# Patient Record
Sex: FEMALE | Race: BLACK OR AFRICAN AMERICAN | NOT HISPANIC OR LATINO
[De-identification: names, ages, dates, MRNs, and addresses within clinical notes are randomized per-mention and may not be internally consistent; named-entity substitution may affect disease eponyms.]

---

## 2017-05-22 ENCOUNTER — APPOINTMENT (OUTPATIENT)
Dept: OTOLARYNGOLOGY | Facility: CLINIC | Age: 72
End: 2017-05-22

## 2017-05-22 VITALS
TEMPERATURE: 98.3 F | OXYGEN SATURATION: 99 % | SYSTOLIC BLOOD PRESSURE: 140 MMHG | DIASTOLIC BLOOD PRESSURE: 83 MMHG | HEART RATE: 115 BPM

## 2017-06-06 ENCOUNTER — APPOINTMENT (OUTPATIENT)
Dept: GASTROENTEROLOGY | Facility: CLINIC | Age: 72
End: 2017-06-06

## 2017-06-06 VITALS
OXYGEN SATURATION: 98 % | HEART RATE: 94 BPM | HEIGHT: 66 IN | BODY MASS INDEX: 32.95 KG/M2 | DIASTOLIC BLOOD PRESSURE: 80 MMHG | TEMPERATURE: 97.8 F | WEIGHT: 205 LBS | RESPIRATION RATE: 16 BRPM | SYSTOLIC BLOOD PRESSURE: 130 MMHG

## 2017-06-06 DIAGNOSIS — Z82.61 FAMILY HISTORY OF ARTHRITIS: ICD-10-CM

## 2017-06-06 DIAGNOSIS — Z86.79 PERSONAL HISTORY OF OTHER DISEASES OF THE CIRCULATORY SYSTEM: ICD-10-CM

## 2017-06-06 DIAGNOSIS — Z86.39 PERSONAL HISTORY OF OTHER ENDOCRINE, NUTRITIONAL AND METABOLIC DISEASE: ICD-10-CM

## 2017-06-06 DIAGNOSIS — Z86.73 PERSONAL HISTORY OF TRANSIENT ISCHEMIC ATTACK (TIA), AND CEREBRAL INFARCTION W/OUT RESIDUAL DEFICITS: ICD-10-CM

## 2017-06-06 DIAGNOSIS — Z80.0 FAMILY HISTORY OF MALIGNANT NEOPLASM OF DIGESTIVE ORGANS: ICD-10-CM

## 2017-06-06 DIAGNOSIS — Z85.9 PERSONAL HISTORY OF MALIGNANT NEOPLASM, UNSPECIFIED: ICD-10-CM

## 2017-06-07 RX ORDER — TRIAMTERENE AND HYDROCHLOROTHIAZIDE 25; 37.5 MG/1; MG/1
37.5-25 TABLET ORAL
Qty: 90 | Refills: 0 | Status: ACTIVE | COMMUNITY
Start: 2017-03-29

## 2017-06-08 ENCOUNTER — APPOINTMENT (OUTPATIENT)
Dept: THORACIC SURGERY | Facility: CLINIC | Age: 72
End: 2017-06-08

## 2017-06-08 VITALS
TEMPERATURE: 97.6 F | BODY MASS INDEX: 32.95 KG/M2 | SYSTOLIC BLOOD PRESSURE: 128 MMHG | WEIGHT: 205 LBS | RESPIRATION RATE: 18 BRPM | HEART RATE: 82 BPM | OXYGEN SATURATION: 99 % | DIASTOLIC BLOOD PRESSURE: 66 MMHG | HEIGHT: 66 IN

## 2017-06-08 DIAGNOSIS — K21.9 GASTRO-ESOPHAGEAL REFLUX DISEASE W/OUT ESOPHAGITIS: ICD-10-CM

## 2017-06-08 DIAGNOSIS — Z87.891 PERSONAL HISTORY OF NICOTINE DEPENDENCE: ICD-10-CM

## 2017-06-08 RX ORDER — HYDROCHLOROTHIAZIDE 12.5 MG/1
12.5 CAPSULE ORAL
Qty: 30 | Refills: 0 | Status: DISCONTINUED | COMMUNITY
Start: 2017-03-04 | End: 2017-06-08

## 2017-06-08 RX ORDER — AZITHROMYCIN 250 MG/1
250 TABLET, FILM COATED ORAL
Qty: 6 | Refills: 0 | Status: DISCONTINUED | COMMUNITY
Start: 2017-04-07 | End: 2017-06-08

## 2017-06-08 RX ORDER — LOSARTAN POTASSIUM 25 MG/1
25 TABLET, FILM COATED ORAL
Qty: 30 | Refills: 0 | Status: DISCONTINUED | COMMUNITY
Start: 2017-03-04 | End: 2017-06-08

## 2017-06-12 ENCOUNTER — APPOINTMENT (OUTPATIENT)
Dept: PULMONOLOGY | Facility: CLINIC | Age: 72
End: 2017-06-12

## 2017-06-12 ENCOUNTER — APPOINTMENT (OUTPATIENT)
Dept: OTOLARYNGOLOGY | Facility: CLINIC | Age: 72
End: 2017-06-12

## 2017-06-12 VITALS
BODY MASS INDEX: 32.95 KG/M2 | WEIGHT: 205 LBS | DIASTOLIC BLOOD PRESSURE: 66 MMHG | OXYGEN SATURATION: 98 % | HEIGHT: 66 IN | SYSTOLIC BLOOD PRESSURE: 110 MMHG | TEMPERATURE: 97.8 F | HEART RATE: 86 BPM

## 2017-06-12 DIAGNOSIS — R91.1 SOLITARY PULMONARY NODULE: ICD-10-CM

## 2017-06-12 RX ORDER — GABAPENTIN 300 MG/1
300 CAPSULE ORAL
Refills: 0 | Status: ACTIVE | COMMUNITY

## 2017-06-13 LAB
ANION GAP SERPL CALC-SCNC: 17 MMOL/L
APTT BLD: 30 SEC
BUN SERPL-MCNC: 16 MG/DL
CALCIUM SERPL-MCNC: 10.1 MG/DL
CHLORIDE SERPL-SCNC: 99 MMOL/L
CO2 SERPL-SCNC: 28 MMOL/L
CREAT SERPL-MCNC: 0.85 MG/DL
GLUCOSE SERPL-MCNC: 103 MG/DL
POTASSIUM SERPL-SCNC: 4.1 MMOL/L
SODIUM SERPL-SCNC: 144 MMOL/L

## 2017-06-14 ENCOUNTER — RESULT REVIEW (OUTPATIENT)
Age: 72
End: 2017-06-14

## 2017-06-14 ENCOUNTER — OUTPATIENT (OUTPATIENT)
Dept: OUTPATIENT SERVICES | Facility: HOSPITAL | Age: 72
LOS: 1 days | Discharge: HOME | End: 2017-06-14
Payer: MEDICARE

## 2017-06-14 ENCOUNTER — APPOINTMENT (OUTPATIENT)
Dept: PULMONOLOGY | Facility: HOSPITAL | Age: 72
End: 2017-06-14

## 2017-06-14 LAB
B PERT IGG+IGM PNL SER: SIGNIFICANT CHANGE UP
B PERT IGG+IGM PNL SER: SIGNIFICANT CHANGE UP
COLOR FLD: SIGNIFICANT CHANGE UP
COLOR FLD: SIGNIFICANT CHANGE UP
FLUID INTAKE SUBSTANCE CLASS: SIGNIFICANT CHANGE UP
FLUID INTAKE SUBSTANCE CLASS: SIGNIFICANT CHANGE UP
FLUID SEGMENTED GRANULOCYTES: 19 % — SIGNIFICANT CHANGE UP
FLUID SEGMENTED GRANULOCYTES: 97 % — SIGNIFICANT CHANGE UP
GRAM STN FLD: SIGNIFICANT CHANGE UP
GRAM STN FLD: SIGNIFICANT CHANGE UP
MONOS+MACROS # FLD: 3 % — SIGNIFICANT CHANGE UP
MONOS+MACROS # FLD: 5 % — SIGNIFICANT CHANGE UP
RCV VOL RI: 2450 /UL — HIGH (ref 0–5)
RCV VOL RI: 68 /UL — HIGH (ref 0–5)
SPECIMEN SOURCE FLD: SIGNIFICANT CHANGE UP
SPECIMEN SOURCE FLD: SIGNIFICANT CHANGE UP
SPECIMEN SOURCE: SIGNIFICANT CHANGE UP
SPECIMEN SOURCE: SIGNIFICANT CHANGE UP
TOTAL NUCLEATED CELL COUNT, BODY FLUID: 193 /UL — HIGH (ref 0–5)
TOTAL NUCLEATED CELL COUNT, BODY FLUID: 24 /UL — HIGH (ref 0–5)
TUBE TYPE: SIGNIFICANT CHANGE UP
TUBE TYPE: SIGNIFICANT CHANGE UP

## 2017-06-14 PROCEDURE — 88305 TISSUE EXAM BY PATHOLOGIST: CPT

## 2017-06-14 PROCEDURE — 87015 SPECIMEN INFECT AGNT CONCNTJ: CPT

## 2017-06-14 PROCEDURE — 87206 SMEAR FLUORESCENT/ACID STAI: CPT

## 2017-06-14 PROCEDURE — 31624 DX BRONCHOSCOPE/LAVAGE: CPT

## 2017-06-14 PROCEDURE — 87102 FUNGUS ISOLATION CULTURE: CPT

## 2017-06-14 PROCEDURE — 87116 MYCOBACTERIA CULTURE: CPT

## 2017-06-14 PROCEDURE — 89051 BODY FLUID CELL COUNT: CPT

## 2017-06-14 PROCEDURE — 87070 CULTURE OTHR SPECIMN AEROBIC: CPT

## 2017-06-14 PROCEDURE — 88112 CYTOPATH CELL ENHANCE TECH: CPT

## 2017-06-14 PROCEDURE — 88173 CYTOPATH EVAL FNA REPORT: CPT

## 2017-06-14 PROCEDURE — 31622 DX BRONCHOSCOPE/WASH: CPT

## 2017-06-14 PROCEDURE — 31653 BRONCH EBUS SAMPLNG 3/> NODE: CPT

## 2017-06-15 LAB
NIGHT BLUE STAIN TISS: SIGNIFICANT CHANGE UP
NIGHT BLUE STAIN TISS: SIGNIFICANT CHANGE UP
NON-GYNECOLOGICAL CYTOLOGY STUDY: SIGNIFICANT CHANGE UP
SPECIMEN SOURCE: SIGNIFICANT CHANGE UP
SPECIMEN SOURCE: SIGNIFICANT CHANGE UP

## 2017-06-16 ENCOUNTER — APPOINTMENT (OUTPATIENT)
Dept: GASTROENTEROLOGY | Facility: CLINIC | Age: 72
End: 2017-06-16

## 2017-06-16 LAB
CULTURE RESULTS: SIGNIFICANT CHANGE UP
CULTURE RESULTS: SIGNIFICANT CHANGE UP
NON-GYNECOLOGICAL CYTOLOGY STUDY: SIGNIFICANT CHANGE UP
SPECIMEN SOURCE: SIGNIFICANT CHANGE UP
SPECIMEN SOURCE: SIGNIFICANT CHANGE UP

## 2017-06-19 DIAGNOSIS — Z79.899 OTHER LONG TERM (CURRENT) DRUG THERAPY: ICD-10-CM

## 2017-06-19 DIAGNOSIS — I10 ESSENTIAL (PRIMARY) HYPERTENSION: ICD-10-CM

## 2017-06-19 DIAGNOSIS — E11.9 TYPE 2 DIABETES MELLITUS WITHOUT COMPLICATIONS: ICD-10-CM

## 2017-06-19 DIAGNOSIS — Z79.82 LONG TERM (CURRENT) USE OF ASPIRIN: ICD-10-CM

## 2017-06-19 DIAGNOSIS — R59.0 LOCALIZED ENLARGED LYMPH NODES: ICD-10-CM

## 2017-06-19 DIAGNOSIS — Z79.84 LONG TERM (CURRENT) USE OF ORAL HYPOGLYCEMIC DRUGS: ICD-10-CM

## 2017-07-06 LAB
CULTURE RESULTS: SIGNIFICANT CHANGE UP
SPECIMEN SOURCE: SIGNIFICANT CHANGE UP

## 2017-07-15 LAB
CULTURE RESULTS: SIGNIFICANT CHANGE UP
SPECIMEN SOURCE: SIGNIFICANT CHANGE UP

## 2017-07-29 LAB
CULTURE RESULTS: SIGNIFICANT CHANGE UP
CULTURE RESULTS: SIGNIFICANT CHANGE UP
SPECIMEN SOURCE: SIGNIFICANT CHANGE UP
SPECIMEN SOURCE: SIGNIFICANT CHANGE UP

## 2017-09-24 ENCOUNTER — FORM ENCOUNTER (OUTPATIENT)
Age: 72
End: 2017-09-24

## 2017-09-25 ENCOUNTER — OUTPATIENT (OUTPATIENT)
Dept: OUTPATIENT SERVICES | Facility: HOSPITAL | Age: 72
LOS: 1 days | End: 2017-09-25
Payer: MEDICARE

## 2017-09-25 PROCEDURE — 71250 CT THORAX DX C-: CPT | Mod: 26

## 2017-09-25 PROCEDURE — 71250 CT THORAX DX C-: CPT

## 2017-09-28 ENCOUNTER — APPOINTMENT (OUTPATIENT)
Dept: PULMONOLOGY | Facility: CLINIC | Age: 72
End: 2017-09-28
Payer: MEDICARE

## 2017-09-28 VITALS — SYSTOLIC BLOOD PRESSURE: 108 MMHG | OXYGEN SATURATION: 97 % | DIASTOLIC BLOOD PRESSURE: 64 MMHG | HEART RATE: 67 BPM

## 2017-09-28 PROCEDURE — 99215 OFFICE O/P EST HI 40 MIN: CPT

## 2017-09-28 RX ORDER — PRAVASTATIN SODIUM 20 MG/1
20 TABLET ORAL
Refills: 0 | Status: ACTIVE | COMMUNITY
Start: 2017-09-28

## 2017-09-28 RX ORDER — SIMVASTATIN 10 MG/1
10 TABLET, FILM COATED ORAL
Qty: 90 | Refills: 0 | Status: DISCONTINUED | COMMUNITY
Start: 2017-04-06 | End: 2017-09-28

## 2018-02-21 ENCOUNTER — APPOINTMENT (OUTPATIENT)
Dept: OTOLARYNGOLOGY | Facility: CLINIC | Age: 73
End: 2018-02-21

## 2018-06-08 ENCOUNTER — APPOINTMENT (OUTPATIENT)
Dept: CT IMAGING | Facility: HOSPITAL | Age: 73
End: 2018-06-08
Payer: MEDICARE

## 2018-06-09 ENCOUNTER — FORM ENCOUNTER (OUTPATIENT)
Age: 73
End: 2018-06-09

## 2018-06-10 ENCOUNTER — APPOINTMENT (OUTPATIENT)
Dept: CT IMAGING | Facility: HOSPITAL | Age: 73
End: 2018-06-10

## 2018-06-10 ENCOUNTER — OUTPATIENT (OUTPATIENT)
Dept: OUTPATIENT SERVICES | Facility: HOSPITAL | Age: 73
LOS: 1 days | End: 2018-06-10
Payer: MEDICARE

## 2018-06-10 PROCEDURE — 71250 CT THORAX DX C-: CPT

## 2018-06-10 PROCEDURE — 71250 CT THORAX DX C-: CPT | Mod: 26

## 2018-06-18 ENCOUNTER — MOBILE ON CALL (OUTPATIENT)
Age: 73
End: 2018-06-18

## 2018-06-28 ENCOUNTER — APPOINTMENT (OUTPATIENT)
Dept: PULMONOLOGY | Facility: CLINIC | Age: 73
End: 2018-06-28
Payer: MEDICARE

## 2018-07-05 ENCOUNTER — APPOINTMENT (OUTPATIENT)
Dept: PULMONOLOGY | Facility: CLINIC | Age: 73
End: 2018-07-05
Payer: MEDICARE

## 2018-07-05 VITALS
BODY MASS INDEX: 32.78 KG/M2 | SYSTOLIC BLOOD PRESSURE: 128 MMHG | HEART RATE: 105 BPM | WEIGHT: 204 LBS | DIASTOLIC BLOOD PRESSURE: 84 MMHG | HEIGHT: 66 IN | OXYGEN SATURATION: 93 %

## 2018-07-05 DIAGNOSIS — R06.02 SHORTNESS OF BREATH: ICD-10-CM

## 2018-07-05 PROCEDURE — 99215 OFFICE O/P EST HI 40 MIN: CPT

## 2018-07-06 RX ORDER — ALBUTEROL SULFATE 90 UG/1
108 (90 BASE) AEROSOL, METERED RESPIRATORY (INHALATION)
Qty: 8.5 | Refills: 5 | Status: ACTIVE | COMMUNITY
Start: 2018-07-06 | End: 1900-01-01

## 2018-07-25 ENCOUNTER — APPOINTMENT (OUTPATIENT)
Dept: OTOLARYNGOLOGY | Facility: CLINIC | Age: 73
End: 2018-07-25
Payer: MEDICARE

## 2018-07-25 ENCOUNTER — OUTPATIENT (OUTPATIENT)
Dept: OUTPATIENT SERVICES | Facility: HOSPITAL | Age: 73
LOS: 1 days | End: 2018-07-25
Payer: MEDICARE

## 2018-07-25 VITALS
WEIGHT: 205 LBS | OXYGEN SATURATION: 100 % | DIASTOLIC BLOOD PRESSURE: 74 MMHG | HEIGHT: 64.5 IN | SYSTOLIC BLOOD PRESSURE: 114 MMHG | TEMPERATURE: 97.7 F | BODY MASS INDEX: 34.57 KG/M2 | HEART RATE: 75 BPM

## 2018-07-25 DIAGNOSIS — R55 SYNCOPE AND COLLAPSE: ICD-10-CM

## 2018-07-25 DIAGNOSIS — C07 MALIGNANT NEOPLASM OF PAROTID GLAND: ICD-10-CM

## 2018-07-25 DIAGNOSIS — R06.02 SHORTNESS OF BREATH: ICD-10-CM

## 2018-07-25 PROCEDURE — 94618 PULMONARY STRESS TESTING: CPT

## 2018-07-25 PROCEDURE — 99213 OFFICE O/P EST LOW 20 MIN: CPT

## 2018-07-25 PROCEDURE — 94618 PULMONARY STRESS TESTING: CPT | Mod: 26

## 2018-07-26 PROBLEM — R55 PRE-SYNCOPE: Status: ACTIVE | Noted: 2018-07-26

## 2018-08-03 ENCOUNTER — APPOINTMENT (OUTPATIENT)
Dept: CARDIOLOGY | Facility: CLINIC | Age: 73
End: 2018-08-03

## 2018-12-27 ENCOUNTER — MOBILE ON CALL (OUTPATIENT)
Age: 73
End: 2018-12-27

## 2019-02-04 PROBLEM — R91.1 PULMONARY LESION: Status: ACTIVE | Noted: 2017-06-12

## 2019-09-27 ENCOUNTER — APPOINTMENT (OUTPATIENT)
Dept: PULMONOLOGY | Facility: CLINIC | Age: 74
End: 2019-09-27
Payer: MEDICARE

## 2019-09-27 VITALS
WEIGHT: 193 LBS | RESPIRATION RATE: 12 BRPM | TEMPERATURE: 97.7 F | OXYGEN SATURATION: 95 % | HEART RATE: 88 BPM | BODY MASS INDEX: 32.55 KG/M2 | DIASTOLIC BLOOD PRESSURE: 80 MMHG | HEIGHT: 64.5 IN | SYSTOLIC BLOOD PRESSURE: 120 MMHG

## 2019-09-27 DIAGNOSIS — R91.1 SOLITARY PULMONARY NODULE: ICD-10-CM

## 2019-09-27 DIAGNOSIS — R59.0 LOCALIZED ENLARGED LYMPH NODES: ICD-10-CM

## 2019-09-27 PROCEDURE — 99214 OFFICE O/P EST MOD 30 MIN: CPT

## 2019-09-27 NOTE — PHYSICAL EXAM
[General Appearance - Well Developed] : well developed [Normal Appearance] : normal appearance [Well Groomed] : well groomed [No Deformities] : no deformities [Normal Conjunctiva] : the conjunctiva exhibited no abnormalities [Eyelids - No Xanthelasma] : the eyelids demonstrated no xanthelasmas [Normal Oropharynx] : normal oropharynx [Jugular Venous Distention Increased] : there was no jugular-venous distention [Neck Appearance] : the appearance of the neck was normal [Neck Cervical Mass (___cm)] : no neck mass was observed [Thyroid Diffuse Enlargement] : the thyroid was not enlarged [Thyroid Nodule] : there were no palpable thyroid nodules [Heart Sounds] : normal S1 and S2 [Heart Rate And Rhythm] : heart rate and rhythm were normal [Murmurs] : no murmurs present [Respiration, Rhythm And Depth] : normal respiratory rhythm and effort [Auscultation Breath Sounds / Voice Sounds] : lungs were clear to auscultation bilaterally [Exaggerated Use Of Accessory Muscles For Inspiration] : no accessory muscle use [Abdomen Tenderness] : non-tender [Abdomen Soft] : soft [Abdomen Mass (___ Cm)] : no abdominal mass palpated [Abnormal Walk] : normal gait [Gait - Sufficient For Exercise Testing] : the gait was sufficient for exercise testing [Nail Clubbing] : no clubbing of the fingernails [Fingers Osler's Nodes] : Osler's nodes were not seenon the fingers [Cyanosis, Localized] : no localized cyanosis [Skin Lesions] : no skin lesions [Sensation] : the sensory exam was normal to light touch and pinprick [Deep Tendon Reflexes (DTR)] : deep tendon reflexes were 2+ and symmetric [Oriented To Time, Place, And Person] : oriented to person, place, and time [No Focal Deficits] : no focal deficits [Affect] : the affect was normal [Mood] : the mood was normal [Skin Color & Pigmentation] : normal skin color and pigmentation [Skin Turgor] : normal skin turgor [] : no rash [General Appearance - Alert] : alert [General Appearance - In No Acute Distress] : in no acute distress [General Appearance - Well Nourished] : well nourished [No Pulse Discrepancy] : no pulse discrepancy detected [Full Pulse] : peripheral pulses were full [Fingers] :  capillary refill of the fingers was normal [Left Fingers] :  capillary refill of the left fingers was normal [Varicose Veins Of The Right Leg] : the patient has no varicose veins of the right leg [Right Fingers] :  capillary refill of the right fingers was normal [Varicose Veins Of The Left Leg] : the patient has no varicose veins of the left leg [FreeTextEntry1] : deferred

## 2019-09-27 NOTE — PROCEDURE
[FreeTextEntry1] : CHEST CT 6/10/18 personally reviewed. Significant for stable partially calcified mediastinal and bilateral hilar lymph nodes. Small, stable linear subpleural opacity in RUL. Small, stable subpleural nodular density in NISREEN. \par \par \par PFTS today 18 personally reviewed. No evidence of obstruction/bronchodilator response. Patient does have reduced DLCO (51% predicted)\par \par \par DLCO: 51% predicted (12.1)\par FEV/FVC1: 78% predicted\par FVC: 104% predicted (3.08)\par FEV1: 107% predicted (2.40)\par T% predicted (4.94)

## 2019-09-27 NOTE — DISCUSSION/SUMMARY
[FreeTextEntry1] : A 72 year old female, with PMH of carotid gland CA s/p Lt parotidectomy in 2004 initially presenting for evaluation of hilar LAP and a pulmonary nodule that was noted incidentally as a part of her work up for rib pain and SOB. S/p bronchoscopy with EBUS with sampling of mediastinal LN which was all negative. Returns today with a repeat CT of the chest which is essentially unchanged. Given the stability of the findings within a long interval - unlikely to be malignant. \par \par \par Plan\par - Repeat CT chest in one year (July 2019)\par - PFTs done today, as she has been experience RODAS X 1.5 years and last breathing studies were back in 2014. PFTs do not demonstrate obstruction, but patient does have a reduced DLCO\par - Will refer patient for a 6 minute walk test for further eval of RODAS

## 2019-09-27 NOTE — ASSESSMENT
[FreeTextEntry1] : Pt doing well, mostly asymptomatic. Will obtain follow up CT chest for her pulm nodule.

## 2019-09-27 NOTE — REVIEW OF SYSTEMS
[As Noted in HPI] : as noted in HPI [Dyspnea] : dyspnea [Negative] : Sleep Disorder [Fever] : no fever [Chills] : no chills [Fatigue] : no fatigue [Dry Eyes] : no dryness of the eyes [Cough] : no cough [Sputum] : not coughing up ~M sputum [Hemoptysis] : no hemoptysis [FreeTextEntry8] : musculoskeletal chest pain

## 2019-09-27 NOTE — HISTORY OF PRESENT ILLNESS
[FreeTextEntry1] : A 72 year old female, with PMH of RUL nodule and adenopathy which has been under radiographic surveillance and she is here for interval 1 year follow up.\par She has no cough, SOB, or hemoptysis. \par

## 2019-10-11 ENCOUNTER — MOBILE ON CALL (OUTPATIENT)
Age: 74
End: 2019-10-11

## 2019-10-11 ENCOUNTER — OTHER (OUTPATIENT)
Age: 74
End: 2019-10-11

## 2019-10-31 ENCOUNTER — APPOINTMENT (OUTPATIENT)
Dept: PULMONOLOGY | Facility: CLINIC | Age: 74
End: 2019-10-31

## 2019-11-13 ENCOUNTER — APPOINTMENT (OUTPATIENT)
Dept: OTOLARYNGOLOGY | Facility: CLINIC | Age: 74
End: 2019-11-13

## 2020-11-04 ENCOUNTER — TRANSCRIBE ORDER (OUTPATIENT)
Dept: SCHEDULING | Age: 75
End: 2020-11-04

## 2020-11-04 DIAGNOSIS — Z12.31 VISIT FOR SCREENING MAMMOGRAM: Primary | ICD-10-CM

## 2021-06-04 ENCOUNTER — TRANSCRIBE ORDER (OUTPATIENT)
Dept: SCHEDULING | Age: 76
End: 2021-06-04

## 2021-06-04 DIAGNOSIS — R91.8 LUNG NODULES: Primary | ICD-10-CM

## 2021-07-07 ENCOUNTER — TRANSCRIBE ORDER (OUTPATIENT)
Dept: REGISTRATION | Age: 76
End: 2021-07-07

## 2021-07-07 ENCOUNTER — HOSPITAL ENCOUNTER (OUTPATIENT)
Dept: LAB | Age: 76
Discharge: HOME | End: 2021-07-07
Attending: INTERNAL MEDICINE
Payer: MEDICARE

## 2021-07-07 ENCOUNTER — HOSPITAL ENCOUNTER (OUTPATIENT)
Dept: CT IMAGING | Age: 76
Discharge: HOME | End: 2021-07-07
Attending: INTERNAL MEDICINE
Payer: MEDICARE

## 2021-07-07 DIAGNOSIS — R91.8 LUNG MASS: ICD-10-CM

## 2021-07-07 DIAGNOSIS — R91.8 LUNG MASS: Primary | ICD-10-CM

## 2021-07-07 DIAGNOSIS — R91.8 LUNG NODULES: ICD-10-CM

## 2021-07-07 LAB — CREAT SERPL-MCNC: 0.92 MG/DL (ref 0.55–1.02)

## 2021-07-07 PROCEDURE — 82565 ASSAY OF CREATININE: CPT

## 2021-07-07 PROCEDURE — 71250 CT THORAX DX C-: CPT

## 2021-07-07 PROCEDURE — 36415 COLL VENOUS BLD VENIPUNCTURE: CPT

## 2021-10-11 LAB — HBA1C MFR BLD HPLC: 6.4 %

## 2022-02-02 ENCOUNTER — OFFICE VISIT (OUTPATIENT)
Dept: INTERNAL MEDICINE CLINIC | Age: 77
End: 2022-02-02
Payer: MEDICARE

## 2022-02-02 VITALS
WEIGHT: 207 LBS | BODY MASS INDEX: 33.27 KG/M2 | DIASTOLIC BLOOD PRESSURE: 80 MMHG | RESPIRATION RATE: 16 BRPM | SYSTOLIC BLOOD PRESSURE: 120 MMHG | HEIGHT: 66 IN | HEART RATE: 80 BPM | OXYGEN SATURATION: 98 %

## 2022-02-02 DIAGNOSIS — Z81.8 FAMILY HISTORY OF DEMENTIA: ICD-10-CM

## 2022-02-02 DIAGNOSIS — I10 PRIMARY HYPERTENSION: ICD-10-CM

## 2022-02-02 DIAGNOSIS — Z83.3 FAMILY HISTORY OF DIABETES MELLITUS: ICD-10-CM

## 2022-02-02 DIAGNOSIS — Z91.119 NONCOMPLIANCE WITH DIETARY RESTRICTION: ICD-10-CM

## 2022-02-02 DIAGNOSIS — Z82.3 FAMILY HISTORY OF CVA: ICD-10-CM

## 2022-02-02 DIAGNOSIS — Z86.73 HISTORY OF TIA (TRANSIENT ISCHEMIC ATTACK): ICD-10-CM

## 2022-02-02 DIAGNOSIS — E78.00 PURE HYPERCHOLESTEROLEMIA: ICD-10-CM

## 2022-02-02 DIAGNOSIS — E11.9 CONTROLLED TYPE 2 DIABETES MELLITUS WITHOUT COMPLICATION, WITHOUT LONG-TERM CURRENT USE OF INSULIN (HCC): ICD-10-CM

## 2022-02-02 PROBLEM — E55.9 VITAMIN D DEFICIENCY: Status: ACTIVE | Noted: 2022-02-02

## 2022-02-02 PROCEDURE — G8427 DOCREV CUR MEDS BY ELIG CLIN: HCPCS | Performed by: INTERNAL MEDICINE

## 2022-02-02 PROCEDURE — 99204 OFFICE O/P NEW MOD 45 MIN: CPT | Performed by: INTERNAL MEDICINE

## 2022-02-02 PROCEDURE — G8417 CALC BMI ABV UP PARAM F/U: HCPCS | Performed by: INTERNAL MEDICINE

## 2022-02-02 PROCEDURE — 1090F PRES/ABSN URINE INCON ASSESS: CPT | Performed by: INTERNAL MEDICINE

## 2022-02-02 PROCEDURE — 3017F COLORECTAL CA SCREEN DOC REV: CPT | Performed by: INTERNAL MEDICINE

## 2022-02-02 PROCEDURE — 3046F HEMOGLOBIN A1C LEVEL >9.0%: CPT | Performed by: INTERNAL MEDICINE

## 2022-02-02 PROCEDURE — 2022F DILAT RTA XM EVC RTNOPTHY: CPT | Performed by: INTERNAL MEDICINE

## 2022-02-02 PROCEDURE — G8510 SCR DEP NEG, NO PLAN REQD: HCPCS | Performed by: INTERNAL MEDICINE

## 2022-02-02 PROCEDURE — G8752 SYS BP LESS 140: HCPCS | Performed by: INTERNAL MEDICINE

## 2022-02-02 PROCEDURE — G9899 SCRN MAM PERF RSLTS DOC: HCPCS | Performed by: INTERNAL MEDICINE

## 2022-02-02 PROCEDURE — G8536 NO DOC ELDER MAL SCRN: HCPCS | Performed by: INTERNAL MEDICINE

## 2022-02-02 PROCEDURE — G8400 PT W/DXA NO RESULTS DOC: HCPCS | Performed by: INTERNAL MEDICINE

## 2022-02-02 PROCEDURE — 1101F PT FALLS ASSESS-DOCD LE1/YR: CPT | Performed by: INTERNAL MEDICINE

## 2022-02-02 PROCEDURE — G8754 DIAS BP LESS 90: HCPCS | Performed by: INTERNAL MEDICINE

## 2022-02-02 RX ORDER — ALBUTEROL SULFATE 90 UG/1
AEROSOL, METERED RESPIRATORY (INHALATION)
COMMUNITY

## 2022-02-02 RX ORDER — TRIAMTERENE AND HYDROCHLOROTHIAZIDE 37.5; 25 MG/1; MG/1
CAPSULE ORAL DAILY
COMMUNITY
End: 2022-03-18 | Stop reason: SDUPTHER

## 2022-02-02 RX ORDER — ASPIRIN 81 MG/1
81 TABLET ORAL DAILY
COMMUNITY

## 2022-02-02 RX ORDER — MELATONIN: COMMUNITY

## 2022-02-02 RX ORDER — PRAVASTATIN SODIUM 20 MG/1
20 TABLET ORAL DAILY
COMMUNITY
End: 2022-04-04 | Stop reason: DRUGHIGH

## 2022-02-02 RX ORDER — LANOLIN ALCOHOL/MO/W.PET/CERES
CREAM (GRAM) TOPICAL
COMMUNITY

## 2022-02-02 RX ORDER — METFORMIN HYDROCHLORIDE 500 MG/1
500 TABLET ORAL DAILY
COMMUNITY
End: 2022-03-18 | Stop reason: SDUPTHER

## 2022-02-02 RX ORDER — DICLOFENAC SODIUM 10 MG/G
GEL TOPICAL
COMMUNITY
Start: 2021-09-17

## 2022-02-02 RX ORDER — MULTIVITAMIN
TABLET ORAL DAILY
COMMUNITY

## 2022-02-02 NOTE — PROGRESS NOTES
César Murdock is a 78 y.o. female and presents with \[facility]\"" Care (New patient)        Ms. Liss Romero came to [facility] with me. She has relocated from [place] in . She used to see nurse practitioner. Year she has consulted cardiologist and pulmonologist.   she has not done any labs since long. She brought some of the records and she had CT scan of the lung which was done in 2021 and it was almost normal and she had calcified middle mediastinal lymph node. She had also CT chest without contrast in past in  that was calcified right hilar lymph node and right paratracheal lymph node. She had brought echocardiogram result that was done in 2018. Her echo showing normal EF. And she had no carotid artery stenosis. Her blood pressure is controlled. She has hypertension with diabetes and history of TIA. She is family history of diabetes CVA and dementia. She has personal history of TIA in the past.  Her BMI is 33.92. She likes to drink soda and juice. She is not doing exercise she told me she is going to start exercising labs ordered. She has no mental health problems. No history of smoking cigarette she is former smoker quit many years ago. No history of substance abuse. I reviewed all the important records that she brought in I reviewed her medicine and I reviewed her past medical history in length that she brought a long list.  She has history of left parotid gland for parotid gland cancer. She has history of fibroid removal from uterus. She has history of cholecystectomy. She had orthopedic surgery. She had history of partial hysterectomy and . No personal or family history of breast cancer or colon cancer. She told me she has not done mammogram since last 2 years and she has done her colonoscopy about 4 years ago which was normal and she has a very good appetite and she told me that she has taken 1 dose of COVID vaccine only.   But she felt some tremors she is going to try second dose. She had so far 11 surgeries done in her life. She quit smoking about 24 years ago. Review of Systems    Review of Systems   Constitutional: Negative. HENT: Negative for sinus pain and sore throat. Respiratory: Negative for cough and wheezing. Cardiovascular: Negative. Gastrointestinal: Negative. Genitourinary: Negative. Musculoskeletal: Negative for falls and myalgias. Neurological: Negative for dizziness, tingling, tremors, sensory change and headaches. Endo/Heme/Allergies: Negative for polydipsia. Psychiatric/Behavioral: Negative. Past Medical History:   Diagnosis Date    CAD (coronary artery disease)     [facility]      delivery delivered 1979    Diabetes ([facility] [address] [facility]     Hypercholesterolemia     Hypertension     [facility]roke [facility])      Past Surgical History:   Procedure Laterality Date    HX  SECTION      HX CHOLECYSTECTOMY      HX CYST REMOVAL  1980    Pylinial cyst and fibroid tissue     HX HYSTERECTOMY  1989    HX KNEE ARTHROSCOPY      IA BREAST SURGERY PROCEDURE UNLISTED Right 1991    right breast laser surgery      Social History     Socioeconomic History    Marital status: SINGLE   Tobacco Use    Smoking status: Former Smoker     Types: Cigarettes     Quit date:      Years since quittin.1    Smokeless tobacco: Never Used   Vaping Use    Vaping Use: Never used   Substance and Sexual Activity    Alcohol use: Yes     Comment: occasionally     Drug use: Not Currently     History reviewed. No pertinent family history. Current Outpatient Medications   Medication Sig Dispense Refill    aspirin delayed-release (Aspir-81) 81 mg tablet Take 81 mg by mouth daily.  calcium-cholecalciferol, D3, (CALTRATE 600+D) tablet Take  by mouth daily.  diclofenac (VOLTAREN) 1 % gel 1 application      metFORMIN (GLUCOPHAGE) 500 mg tablet Take 500 mg by mouth daily.       pravastatin (PRAVACHOL) 20 mg tablet Take 20 mg by mouth daily.  triamterene-hydroCHLOROthiazide (DYAZIDE) 37.5-25 mg per capsule Take  by mouth daily.  albuterol (PROVENTIL HFA, VENTOLIN HFA, PROAIR HFA) 90 mcg/actuation inhaler INHALE 2 PUFFS BY MOUTH EVERY 4 TO 6 HOURS AS NEEDED FOR WHEEZING      cyanocobalamin 1,000 mcg tablet 1 tablet      cholecalciferol (Vitamin D3) (1000 Units /25 mcg) tablet 1 tablet       Allergies   Allergen Reactions    Adhesive Unknown (comments)    Ketoconazole Hives    Nystatin Hives    Surgical Lubricant Jelly Unknown (comments)       Objective:  Visit Vitals  /80 (BP 1 Location: Right upper arm, BP Patient Position: Sitting, BP Cuff Size: Adult)   Pulse 80   Resp 16   Ht 5' 5.5\" (1.664 m)   Wt 207 lb (93.9 kg)   SpO2 98%   BMI 33.92 kg/m²       Physical Exam:   Constitutional: General Appearance: Pleasant. Level of Distress: NAD. Psychiatric: Mental Status: normal mood and affect Orientation: to time, place, and person. ,normal eye contact. Head: Head: normocephalic and atraumatic. Eyes: Pupils: PERRLA. Sclerae: non-icteric. Neck: Neck: supple, trachea midline, and no masses. Lymph Nodes: no cervical LAD. Thyroid: no enlargement or nodules and non-tender. Lungs: Respiratory effort: no dyspnea. Auscultation: no wheezing, rales/crackles, or rhonchi and breath sounds normal and good air movement. Cardiovascular: Apical Impulse: not displaced. Heart Auscultation: normal S1 and S2; no murmurs, rubs, or gallops; and RRR. Neck vessels: no carotid bruits. Pulses including femoral / pedal: normal throughout. Abdomen: Bowel Sounds: normal. Inspection and Palpation: no tenderness, guarding, or masses and soft and non-distended. Liver: non-tender and no hepatomegaly. Spleen: non-tender and no splenomegaly. Musculoskeletal[de-identified] Extremities: no edema,no varicosities. No Calf tenderness. Neurologic: Gait and Station: normal gait and station. Motor Strength normal right and left.    Skin: Inspection and palpation: no rash, lesions, or ulcer. Results for orders placed or performed during the hospital encounter of 21   CREATININE   Result Value Ref Range    Creatinine 0.92 0.55 - 1.02 mg/dL       Assessment/Plan:      ICD-10-CM ICD-9-CM    1. Primary hypertension  I10 401.9 CBC WITH AUTOMATED DIFF      METABOLIC PANEL, COMPREHENSIVE      TSH 3RD GENERATION   2. Controlled type 2 diabetes mellitus without complication, without long-term current use of insulin (HCC)  E11.9 250.00 CBC WITH AUTOMATED DIFF      METABOLIC PANEL, COMPREHENSIVE      HEMOGLOBIN A1C WITH EAG      URINALYSIS W/ RFLX MICROSCOPIC      TSH 3RD GENERATION      MICROALBUMIN, UR, RAND W/ MICROALB/CREAT RATIO      REFERRAL TO DIABETIC EDUCATION   3. Pure hypercholesterolemia  E78.00 272.0 LIPID PANEL   4. History of TIA (transient ischemic attack)  Z86.73 V12.54    5. Family history of dementia  Z81.8 V17.2    6. Family history of CVA  Z82.3 V17.1    7. Family history of diabetes mellitus  Z83.3 V18.0    8. BMI 33.0-33.9,adult  Z68.33 V85.33    9. Noncompliance with dietary restriction  Z91.11 V15.81      Orders Placed This Encounter    CBC WITH AUTOMATED DIFF    METABOLIC PANEL, COMPREHENSIVE    LIPID PANEL    HEMOGLOBIN A1C WITH EAG    URINALYSIS W/ RFLX MICROSCOPIC    TSH 3RD GENERATION    MICROALBUMIN, UR, RAND W/ MICROALB/CREAT RATIO    REFERRAL TO DIABETIC EDUCATION     Referral Priority:   Routine     Referral Type:   Consultation     Referral Reason:   Specialty Services Required     Number of Visits Requested:   1    aspirin delayed-release (Aspir-81) 81 mg tablet     Sig: Take 81 mg by mouth daily.  calcium-cholecalciferol, D3, (CALTRATE 600+D) tablet     Sig: Take  by mouth daily.  diclofenac (VOLTAREN) 1 % gel     Si application    metFORMIN (GLUCOPHAGE) 500 mg tablet     Sig: Take 500 mg by mouth daily.  pravastatin (PRAVACHOL) 20 mg tablet     Sig: Take 20 mg by mouth daily.     triamterene-hydroCHLOROthiazide (DYAZIDE) 37.5-25 mg per capsule     Sig: Take  by mouth daily.  albuterol (PROVENTIL HFA, VENTOLIN HFA, PROAIR HFA) 90 mcg/actuation inhaler     Sig: INHALE 2 PUFFS BY MOUTH EVERY 4 TO 6 HOURS AS NEEDED FOR WHEEZING    cyanocobalamin 1,000 mcg tablet     Si tablet    cholecalciferol (Vitamin D3) (1000 Units /25 mcg) tablet     Si tablet       Hypertension controlled I was surprised that she does not have any allergic reaction to ACE inhibitor or ARB or any angioedema or cough and she is diabetic she should be on ACE or ARB but she is on triamterene HCTZ 37.5/25 mg/day and she told me her nephrologist in [place] started on this medicine. I will review her labs before changing the medicines. Diet low in sodium. Type 2 diabetes mellitus no baseline hemoglobin A1c results available continued on Metformin 500 mg that she takes once a day she does not check blood sugar at home regular ophthalmologic checkup.,  That she should start minimum 5000 steps per day and she should start calorie restricted 1400-calorie sontinue low-dose of aspirin 81 mg/day and statin pravastatin 20 mg at bedtime walking diabetic diet she should stop drinking regular soda and juice that she drinks off-and-on. Hypercholesteremia with history of TIA continue pravastatin 20 mg at bedtime no records available from [place] that she had PCP in [place] that she moved in . History of asthma she is not using albuterol she quit smoking about 24 years ago. 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 She told me she is going to go to [place] to help her sister's son who is having stroke but she is going to have second dose of COVID. I reviewed all her echo results and CT scan lung results. Baseline labs ordered I will give refills after reviewing her renal function. Follow-up in 2 months.     I have referred her to diabetic dietary education classes to have  more profound knowledge and  diabetic education      lose weight, increase physical activity, continue present plan, routine labs ordered, call if any problems    There are no Patient Instructions on file for this visit. Follow-up and Dispositions    · Return in about 2 months (around 4/2/2022) for diabetes, hypertension,cholesterol follow upfast labs .

## 2022-02-02 NOTE — PROGRESS NOTES
Chief Complaint   Patient presents with   [facility]     New patient             Visit Vitals  /80 (BP 1 Location: Left upper arm, BP Patient Position: Sitting)   Pulse 85   Resp 16   Ht 5' 5.5\" (1.664 m)   Wt 207 lb (93.9 kg)   SpO2 98%   BMI 33.92 kg/m²               1. Have you been to the ER, urgent care clinic since your last visit? Hospitalized since your last visit? No    2. Have you seen or consulted any other health care providers outside of the [address] [place] [address] since your last visit? Include any pap smears or colon screening.  No

## 2022-03-06 PROBLEM — Z11.59 NEED FOR HEPATITIS C SCREENING TEST: Status: ACTIVE | Noted: 2022-03-06

## 2022-03-06 PROBLEM — Z12.11 SCREENING FOR COLON CANCER: Status: ACTIVE | Noted: 2022-03-06

## 2022-03-06 PROBLEM — Z12.31 SCREENING MAMMOGRAM FOR BREAST CANCER: Status: ACTIVE | Noted: 2022-03-06

## 2022-03-17 ENCOUNTER — TELEPHONE (OUTPATIENT)
Dept: INTERNAL MEDICINE CLINIC | Age: 77
End: 2022-03-17

## 2022-03-17 NOTE — TELEPHONE ENCOUNTER
----- Message from Missy Maldonado sent at 3/17/2022 11:50 AM EDT -----  Subject: Appointment Request    Reason for Call: Routine Existing Condition Follow Up (Diabetes)    QUESTIONS  Type of Appointment? Established Patient  Reason for appointment request? No appointments available during search  Additional Information for Provider? Patient is needing a to reschedule   her appointment she has scheduled for today 3/17/2022 2:15 PM however   there is no availability . Please reach out to patient in this regards   . Patient is also asking if medication could be filled because she is   running low .  ---------------------------------------------------------------------------  --------------  CALL BACK INFO  What is the best way for the office to contact you? OK to leave message on   voicemail  Preferred Call Back Phone Number? [phone]  ---------------------------------------------------------------------------  --------------  SCRIPT ANSWERS  Relationship to Patient? Self  Have your symptoms changed? No  Is this follow up request related to routine Diabetes Management? Yes  Have you been diagnosed with, awaiting test results for, or told that you   are suspected of having COVID-19 (Coronavirus)? (If patient has tested   negative or was tested as a requirement for work, school, or travel and   not based on symptoms, answer no)? No  Within the past 10 days have you developed any of the following symptoms   (answer no if symptoms have been present longer than 10 days or began   more than 10 days ago)? Fever or Chills, Cough, Shortness of breath or   difficulty breathing, Loss of taste or smell, Sore throat, Nasal   congestion, Sneezing or runny nose, Fatigue or generalized body aches   (answer no if pain is specific to a body part e.g. back pain), Diarrhea,   Headache? No  Have you had close contact with someone with COVID-19 in the last 7 days?    No  (Service Expert  click yes below to proceed with [employer] As Usual   Scheduling)?  Yes

## 2022-03-18 PROBLEM — Z83.3 FAMILY HISTORY OF DIABETES MELLITUS: Status: ACTIVE | Noted: 2022-02-02

## 2022-03-18 PROBLEM — Z11.59 NEED FOR HEPATITIS C SCREENING TEST: Status: ACTIVE | Noted: 2022-03-06

## 2022-03-18 RX ORDER — METFORMIN HYDROCHLORIDE 500 MG/1
500 TABLET ORAL DAILY
Qty: 30 TABLET | Refills: 0 | Status: SHIPPED | OUTPATIENT
Start: 2022-03-18 | End: 2022-04-04 | Stop reason: DRUGHIGH

## 2022-03-18 RX ORDER — TRIAMTERENE AND HYDROCHLOROTHIAZIDE 37.5; 25 MG/1; MG/1
1 CAPSULE ORAL DAILY
Qty: 30 CAPSULE | Refills: 0 | Status: SHIPPED | OUTPATIENT
Start: 2022-03-18 | End: 2022-04-08 | Stop reason: SDUPTHER

## 2022-03-19 PROBLEM — E55.9 VITAMIN D DEFICIENCY: Status: ACTIVE | Noted: 2022-02-02

## 2022-03-19 PROBLEM — Z82.3 FAMILY HISTORY OF CVA: Status: ACTIVE | Noted: 2022-02-02

## 2022-03-19 PROBLEM — Z12.31 SCREENING MAMMOGRAM FOR BREAST CANCER: Status: ACTIVE | Noted: 2022-03-06

## 2022-03-19 PROBLEM — E11.9 CONTROLLED TYPE 2 DIABETES MELLITUS WITHOUT COMPLICATION, WITHOUT LONG-TERM CURRENT USE OF INSULIN (HCC): Status: ACTIVE | Noted: 2022-02-02

## 2022-03-19 PROBLEM — Z81.8 FAMILY HISTORY OF DEMENTIA: Status: ACTIVE | Noted: 2022-02-02

## 2022-03-19 PROBLEM — Z86.73 HISTORY OF TIA (TRANSIENT ISCHEMIC ATTACK): Status: ACTIVE | Noted: 2022-02-02

## 2022-03-19 PROBLEM — E78.00 PURE HYPERCHOLESTEROLEMIA: Status: ACTIVE | Noted: 2022-02-02

## 2022-03-20 PROBLEM — I10 PRIMARY HYPERTENSION: Status: ACTIVE | Noted: 2022-02-02

## 2022-03-22 ENCOUNTER — TELEPHONE (OUTPATIENT)
Dept: INTERNAL MEDICINE CLINIC | Age: 77
End: 2022-03-22

## 2022-03-22 NOTE — TELEPHONE ENCOUNTER
----- Message from Neil Torres sent at 3/17/2022 11:53 AM EDT -----  Subject: Refill Request    QUESTIONS  Name of Medication? Other - metFORMIN (GLUCOPHAGE) 500 mg tablet  Patient-reported dosage and instructions? 500mg 1 tablet a day   How many days do you have left? 10  [id number]  Pharmacy phone number (if available)? [phone]  ---------------------------------------------------------------------------  --------------,  Name of Medication? Other - triamterene-hydroCHLOROthiazide (DYAZIDE)   37.5-25 mg per   Patient-reported dosage and instructions? 37.5-25 mg tke 1 capsule   How many days do you have left? 12  [facility]? [id number]  Pharmacy phone number (if available)? [phone]  ---------------------------------------------------------------------------  --------------  CALL BACK INFO  What is the best way for the office to contact you? OK to leave message on   voicemail  Preferred Call Back Phone Number?  [phone]

## 2022-03-31 ENCOUNTER — TELEPHONE (OUTPATIENT)
Dept: INTERNAL MEDICINE CLINIC | Age: 77
End: 2022-03-31

## 2022-03-31 LAB
ALBUMIN SERPL-MCNC: 4.7 G/DL (ref 3.7–4.7)
ALBUMIN/CREAT UR: 6 MG/G CREAT (ref 0–29)
ALBUMIN/GLOB SERPL: 1.4 {RATIO} (ref 1.2–2.2)
ALP SERPL-CCNC: 125 IU/L (ref 44–121)
ALT SERPL-CCNC: 20 IU/L (ref 0–32)
APPEARANCE UR: CLEAR
AST SERPL-CCNC: 24 IU/L (ref 0–40)
BACTERIA #/AREA URNS HPF: ABNORMAL /[HPF]
BASOPHILS # BLD AUTO: 0 X10E3/UL (ref 0–0.2)
BASOPHILS NFR BLD AUTO: 1 %
BILIRUB SERPL-MCNC: 0.3 MG/DL (ref 0–1.2)
BILIRUB UR QL STRIP: NEGATIVE
BUN SERPL-MCNC: 20 MG/DL (ref 8–27)
BUN/CREAT SERPL: 23 (ref 12–28)
CALCIUM SERPL-MCNC: 9.7 MG/DL (ref 8.7–10.3)
CASTS URNS QL MICRO: ABNORMAL /LPF
CHLORIDE SERPL-SCNC: 97 MMOL/L (ref 96–106)
CHOLEST SERPL-MCNC: 177 MG/DL (ref 100–199)
CO2 SERPL-SCNC: 21 MMOL/L (ref 20–29)
COLOR UR: YELLOW
CREAT SERPL-MCNC: 0.86 MG/DL (ref 0.57–1)
CREAT UR-MCNC: 151.2 MG/DL
EGFR: 71 ML/MIN/1.73
EOSINOPHIL # BLD AUTO: 0.1 X10E3/UL (ref 0–0.4)
EOSINOPHIL NFR BLD AUTO: 2 %
EPI CELLS #/AREA URNS HPF: ABNORMAL /HPF (ref 0–10)
ERYTHROCYTE [DISTWIDTH] IN BLOOD BY AUTOMATED COUNT: 13.5 % (ref 11.7–15.4)
EST. AVERAGE GLUCOSE BLD GHB EST-MCNC: 146 MG/DL
GLOBULIN SER CALC-MCNC: 3.3 G/DL (ref 1.5–4.5)
GLUCOSE SERPL-MCNC: 123 MG/DL (ref 65–99)
GLUCOSE UR QL STRIP: NEGATIVE
HBA1C MFR BLD: 6.7 % (ref 4.8–5.6)
HCT VFR BLD AUTO: 36.9 % (ref 34–46.6)
HDLC SERPL-MCNC: 68 MG/DL
HGB BLD-MCNC: 12.8 G/DL (ref 11.1–15.9)
HGB UR QL STRIP: NEGATIVE
IMM GRANULOCYTES # BLD AUTO: 0 X10E3/UL (ref 0–0.1)
IMM GRANULOCYTES NFR BLD AUTO: 0 %
KETONES UR QL STRIP: ABNORMAL
LDLC SERPL CALC-MCNC: 96 MG/DL (ref 0–99)
LEUKOCYTE ESTERASE UR QL STRIP: ABNORMAL
LYMPHOCYTES # BLD AUTO: 1.5 X10E3/UL (ref 0.7–3.1)
LYMPHOCYTES NFR BLD AUTO: 30 %
MCH RBC QN AUTO: 31 PG (ref 26.6–33)
MCHC RBC AUTO-ENTMCNC: 34.7 G/DL (ref 31.5–35.7)
MCV RBC AUTO: 89 FL (ref 79–97)
MICRO URNS: ABNORMAL
MICROALBUMIN UR-MCNC: 8.7 UG/ML
MONOCYTES # BLD AUTO: 0.4 X10E3/UL (ref 0.1–0.9)
MONOCYTES NFR BLD AUTO: 9 %
NEUTROPHILS # BLD AUTO: 3 X10E3/UL (ref 1.4–7)
NEUTROPHILS NFR BLD AUTO: 58 %
NITRITE UR QL STRIP: NEGATIVE
PH UR STRIP: 5.5 [PH] (ref 5–7.5)
PLATELET # BLD AUTO: 277 X10E3/UL (ref 150–450)
POTASSIUM SERPL-SCNC: 3.8 MMOL/L (ref 3.5–5.2)
PROT SERPL-MCNC: 8 G/DL (ref 6–8.5)
PROT UR QL STRIP: NEGATIVE
RBC # BLD AUTO: 4.13 X10E6/UL (ref 3.77–5.28)
RBC #/AREA URNS HPF: ABNORMAL /HPF (ref 0–2)
SODIUM SERPL-SCNC: 139 MMOL/L (ref 134–144)
SP GR UR STRIP: 1.02 (ref 1–1.03)
TRIGL SERPL-MCNC: 70 MG/DL (ref 0–149)
TSH SERPL DL<=0.005 MIU/L-ACNC: 1.6 UIU/ML (ref 0.45–4.5)
UROBILINOGEN UR STRIP-MCNC: 0.2 MG/DL (ref 0.2–1)
VLDLC SERPL CALC-MCNC: 13 MG/DL (ref 5–40)
WBC # BLD AUTO: 5 X10E3/UL (ref 3.4–10.8)
WBC #/AREA URNS HPF: ABNORMAL /HPF (ref 0–5)

## 2022-04-01 ENCOUNTER — TELEPHONE (OUTPATIENT)
Dept: INTERNAL MEDICINE CLINIC | Age: 77
End: 2022-04-01

## 2022-04-01 NOTE — PROGRESS NOTES
Please call Ms. Britney Gaona that her complete blood count is normal.Hemoglobin A1c 3-month average sugar is 6.7%. She should be on low-dose of she should be on Metformin 500 mg 1 pill twice a day with meal. she should be on low-dose of statin like pravastatin 10 mg at bedtime even though cholesterol readings within normal range she should increase Metformin instead of once a day twice a day. Urine shows some leukocytes and microscopic shows some  white blood cells that suggest some bladder infection so please ask her whether she has any symptoms of bladder infection like frequency burning pain or pressure sensation in pelvis. if she is not allergic to anything we can start her on nitrofurantoin 50 mg every 6 hourly for 1 week. TSH normal.Cholesterol readings are within normal range. Her liver enzymes kidney function potassium normal.  Nitrofurantoin is antibiotic to control infection. If she agrees we can give her to the local pharmacy.

## 2022-04-01 NOTE — TELEPHONE ENCOUNTER
Pt called returning message for lab results, I reviewed them with her. Pt states that she cannot take Metformin BID as it caused sweats and fainting in past. She is also currently taking Pravastatin 20mg, does she need to decrease it? If so she will need a new prescription sent in to [facility]. She is having pressure in pelvic area, please send abx to [facility].

## 2022-04-04 RX ORDER — METFORMIN HYDROCHLORIDE 500 MG/1
500 TABLET ORAL 2 TIMES DAILY WITH MEALS
Qty: 180 TABLET | Refills: 0 | Status: SHIPPED | OUTPATIENT
Start: 2022-04-04 | End: 2022-08-31

## 2022-04-04 RX ORDER — PRAVASTATIN SODIUM 10 MG/1
10 TABLET ORAL
Qty: 180 TABLET | Refills: 0 | Status: SHIPPED | OUTPATIENT
Start: 2022-04-04 | End: 2022-07-05 | Stop reason: SDUPTHER

## 2022-04-04 RX ORDER — NITROFURANTOIN MACROCRYSTALS 50 MG/1
50 CAPSULE ORAL EVERY 6 HOURS
Qty: 28 CAPSULE | Refills: 0 | Status: SHIPPED | OUTPATIENT
Start: 2022-04-04 | End: 2022-04-11

## 2022-04-05 PROBLEM — Z12.31 SCREENING MAMMOGRAM FOR BREAST CANCER: Status: RESOLVED | Noted: 2022-03-06 | Resolved: 2022-04-05

## 2022-04-05 PROBLEM — Z11.59 NEED FOR HEPATITIS C SCREENING TEST: Status: RESOLVED | Noted: 2022-03-06 | Resolved: 2022-04-05

## 2022-04-08 ENCOUNTER — TELEPHONE (OUTPATIENT)
Dept: INTERNAL MEDICINE CLINIC | Age: 77
End: 2022-04-08

## 2022-04-08 RX ORDER — TRIAMTERENE AND HYDROCHLOROTHIAZIDE 37.5; 25 MG/1; MG/1
1 CAPSULE ORAL DAILY
Qty: 30 CAPSULE | Refills: 3 | Status: SHIPPED | OUTPATIENT
Start: 2022-04-08 | End: 2022-04-18 | Stop reason: SDUPTHER

## 2022-04-08 NOTE — TELEPHONE ENCOUNTER
Patient it requesting Triamterene-Hydrochlorothiazide 37.5[facility]25mg [facility]

## 2022-04-08 NOTE — TELEPHONE ENCOUNTER
Pt called stating that she injured her finger about 2 weeks ago while gardening, there is no open wound. She has been treating it with Saline solution and neosporin. Pt states there is redness around it and warmness. I recommended for pt to be seen at [facility].

## 2022-04-19 RX ORDER — TRIAMTERENE AND HYDROCHLOROTHIAZIDE 37.5; 25 MG/1; MG/1
1 CAPSULE ORAL DAILY
Qty: 30 CAPSULE | Refills: 3 | Status: SHIPPED | OUTPATIENT
Start: 2022-04-19 | End: 2022-07-05 | Stop reason: SDUPTHER

## 2022-04-27 ENCOUNTER — TELEPHONE (OUTPATIENT)
Dept: INTERNAL MEDICINE CLINIC | Age: 77
End: 2022-04-27

## 2022-04-27 NOTE — TELEPHONE ENCOUNTER
----- Message from Cassia cAevedo sent at 4/15/2022  1:30 PM EDT -----  Subject: Medication Problem    QUESTIONS  Name of Medication? triamterene-hydroCHLOROthiazide (DYAZIDE) 37.5-25 mg   per capsule  Patient-reported dosage and instructions? 37.5-25MG, Take 1 Capsule by   mouth daily  What question or problem do you have with the medication? Sent to wrong   pharmacy please send to pharmacy below. Patient will run out Tuesday  Preferred Pharmacy? [address] phone number (if available)? [phone]  Additional Information for Provider?   ---------------------------------------------------------------------------  --------------  CALL BACK INFO  What is the best way for the office to contact you? OK to leave message on   voicemail  Preferred Call Back Phone Number? [phone]  ---------------------------------------------------------------------------  --------------  SCRIPT ANSWERS  Relationship to Patient?  Self

## 2022-05-10 RX ORDER — TRIAMTERENE AND HYDROCHLOROTHIAZIDE 37.5; 25 MG/1; MG/1
1 CAPSULE ORAL DAILY
Qty: 30 CAPSULE | Refills: 3 | OUTPATIENT
Start: 2022-05-10

## 2022-07-02 PROBLEM — N95.9 MENOPAUSAL AND POSTMENOPAUSAL DISORDER: Status: ACTIVE | Noted: 2022-07-02

## 2022-07-02 PROBLEM — Z12.11 SCREENING FOR COLON CANCER: Status: ACTIVE | Noted: 2022-03-06

## 2022-07-05 ENCOUNTER — OFFICE VISIT (OUTPATIENT)
Dept: INTERNAL MEDICINE CLINIC | Age: 77
End: 2022-07-05
Payer: MEDICARE

## 2022-07-05 VITALS
HEART RATE: 72 BPM | BODY MASS INDEX: 34.72 KG/M2 | DIASTOLIC BLOOD PRESSURE: 78 MMHG | RESPIRATION RATE: 20 BRPM | WEIGHT: 208.4 LBS | OXYGEN SATURATION: 96 % | SYSTOLIC BLOOD PRESSURE: 130 MMHG | HEIGHT: 65 IN | TEMPERATURE: 98 F

## 2022-07-05 DIAGNOSIS — I10 PRIMARY HYPERTENSION: ICD-10-CM

## 2022-07-05 DIAGNOSIS — M47.27 OSTEOARTHRITIS OF SPINE WITH RADICULOPATHY, LUMBOSACRAL REGION: ICD-10-CM

## 2022-07-05 DIAGNOSIS — Z11.59 NEED FOR HEPATITIS C SCREENING TEST: ICD-10-CM

## 2022-07-05 DIAGNOSIS — E78.00 PURE HYPERCHOLESTEROLEMIA: ICD-10-CM

## 2022-07-05 DIAGNOSIS — E11.9 CONTROLLED TYPE 2 DIABETES MELLITUS WITHOUT COMPLICATION, WITHOUT LONG-TERM CURRENT USE OF INSULIN (HCC): ICD-10-CM

## 2022-07-05 DIAGNOSIS — N95.9 MENOPAUSAL AND POSTMENOPAUSAL DISORDER: ICD-10-CM

## 2022-07-05 DIAGNOSIS — Z12.11 SCREENING FOR COLON CANCER: ICD-10-CM

## 2022-07-05 DIAGNOSIS — Z86.73 HISTORY OF TIA (TRANSIENT ISCHEMIC ATTACK): ICD-10-CM

## 2022-07-05 LAB
GLUCOSE POC: 112 MG/DL
HBA1C MFR BLD HPLC: 6.6 %

## 2022-07-05 PROCEDURE — 3017F COLORECTAL CA SCREEN DOC REV: CPT | Performed by: INTERNAL MEDICINE

## 2022-07-05 PROCEDURE — G8752 SYS BP LESS 140: HCPCS | Performed by: INTERNAL MEDICINE

## 2022-07-05 PROCEDURE — G8427 DOCREV CUR MEDS BY ELIG CLIN: HCPCS | Performed by: INTERNAL MEDICINE

## 2022-07-05 PROCEDURE — 1101F PT FALLS ASSESS-DOCD LE1/YR: CPT | Performed by: INTERNAL MEDICINE

## 2022-07-05 PROCEDURE — G8536 NO DOC ELDER MAL SCRN: HCPCS | Performed by: INTERNAL MEDICINE

## 2022-07-05 PROCEDURE — 3044F HG A1C LEVEL LT 7.0%: CPT | Performed by: INTERNAL MEDICINE

## 2022-07-05 PROCEDURE — G8754 DIAS BP LESS 90: HCPCS | Performed by: INTERNAL MEDICINE

## 2022-07-05 PROCEDURE — 99214 OFFICE O/P EST MOD 30 MIN: CPT | Performed by: INTERNAL MEDICINE

## 2022-07-05 PROCEDURE — 1090F PRES/ABSN URINE INCON ASSESS: CPT | Performed by: INTERNAL MEDICINE

## 2022-07-05 PROCEDURE — 82962 GLUCOSE BLOOD TEST: CPT | Performed by: INTERNAL MEDICINE

## 2022-07-05 PROCEDURE — 2022F DILAT RTA XM EVC RTNOPTHY: CPT | Performed by: INTERNAL MEDICINE

## 2022-07-05 PROCEDURE — G8400 PT W/DXA NO RESULTS DOC: HCPCS | Performed by: INTERNAL MEDICINE

## 2022-07-05 PROCEDURE — G8510 SCR DEP NEG, NO PLAN REQD: HCPCS | Performed by: INTERNAL MEDICINE

## 2022-07-05 PROCEDURE — G8417 CALC BMI ABV UP PARAM F/U: HCPCS | Performed by: INTERNAL MEDICINE

## 2022-07-05 PROCEDURE — 83036 HEMOGLOBIN GLYCOSYLATED A1C: CPT | Performed by: INTERNAL MEDICINE

## 2022-07-05 RX ORDER — TRIAMTERENE AND HYDROCHLOROTHIAZIDE 37.5; 25 MG/1; MG/1
1 CAPSULE ORAL EVERY MORNING
Qty: 90 CAPSULE | Refills: 1 | Status: SHIPPED | OUTPATIENT
Start: 2022-07-05 | End: 2022-09-01 | Stop reason: SDUPTHER

## 2022-07-05 RX ORDER — PRAVASTATIN SODIUM 10 MG/1
10 TABLET ORAL
Qty: 90 TABLET | Refills: 1 | Status: SHIPPED | OUTPATIENT
Start: 2022-07-05 | End: 2022-09-01 | Stop reason: SDUPTHER

## 2022-07-05 NOTE — PROGRESS NOTES
1. \"Have you been to the ER, urgent care clinic since your last visit? Hospitalized since your last visit? \" No    2. \"Have you seen or consulted any other health care providers outside of the [address] [place] [address] since your last visit? \" No     3. For patients aged 39-70: Has the patient had a colonoscopy / FIT/ Cologuard? Yes - Care Gap present. Rooming MA/LPN to request most recent results [place] 2020      If the patient is female:    4. For patients aged 41-77: Has the patient had a mammogram within the past 2 years? No      5. For patients aged 21-65: Has the patient had a pap smear? NA - based on age or sex     Visit Vitals  /74 (BP 1 Location: Left arm)   Pulse 78   Temp 98 °F (36.7 °C) (Temporal)   Resp 20   Ht 5' 5\" (1.651 m)   Wt 208 lb 6.4 oz (94.5 kg)   SpO2 96%   BMI 34.68 kg/m²         .   Chief Complaint   Patient presents with    Follow Up Chronic Condition    Hypertension    Diabetes     bs 112

## 2022-07-05 NOTE — PROGRESS NOTES
Brisa Garcia is a 78 y.o. female and presents with Follow Up Chronic Condition, Hypertension, and Diabetes (bs 112)    Ms. Alesia Gastelum came for follow-up for her chronic medical problems. Today her hemoglobin A1c is 6.6% and random blood sugar is 112 in the office. She is getting physical therapy and going to have acupressure and she was referred by orthopedic surgeon for her physical therapy having diffuse DJD in lumbar spine and sometimes having radiculopathy symptoms with burning pain in the feet and also she has sometimes numbness in the arm. She also has consulted podiatrist and almost the fracture of the first metatarsal bone in her left foot has been resolved. Her blood pressure is controlled with current medication triamterene HCTZ 37.5/25 mg/day. She has relocated from [place]. She does not recall why she is on ACE inhibitor or ARB examples given to her like lisinopril or losartan and she said that there was some allergic reaction that she could not recall so I am afraid to start her with her if she has angioedema or not she said she will call her physician PCP in [place] to find out why she was discontinued after 30 days of that medicine which starts from L. She takes metformin 500 mg once a day only. She takes vitamin D and pravastatin 10 mg at bedtime. She needs refills. She thinks that she has taken Pneumovax 23 and other 2 vaccinations however she is going to verify [place]. She agreed to do blood work. She has done her bone density 3 years ago so she is due and referred her. She has no depression. She says she is going to make appointment with ophthalmologist.  She had history of TIA in the past.    She says that she was recommended to do colonoscopy every 5 years but she does not want to do colonoscopy and prefers to have Cologuard. Review of Systems    Review of Systems   Constitutional: Negative. HENT: Negative for sinus pain and sore throat.     Eyes: Negative for blurred vision. Respiratory: Negative for cough and wheezing. Cardiovascular: Negative. Gastrointestinal: Negative. Genitourinary: Negative. Musculoskeletal: Negative for falls, joint pain, myalgias and neck pain. Djd back and displaced fracture of first metatarsal bone in lt foot /near complete resolution. She is getting physical therapy for diffuse DJD in lumbar spine and sometimes having burning pain in foot   Neurological: Negative for dizziness, tingling, tremors and headaches. Endo/Heme/Allergies: Negative for polydipsia. Psychiatric/Behavioral: Negative. Past Medical History:   Diagnosis Date    CAD (coronary artery disease)     [facility] [address]      delivery delivered 1979    Diabetes ([facility] [address]     Hypercholesterolemia     Hypertension     Stroke [facility])      Past Surgical History:   Procedure Laterality Date    HX  SECTION      HX CHOLECYSTECTOMY      HX CYST REMOVAL  1980    Pylinial cyst and fibroid tissue     HX HYSTERECTOMY  1989    HX KNEE ARTHROSCOPY      [place] BREAST SURGERY PROCEDURE UNLISTED Right 1991    right breast laser surgery      Social History     Socioeconomic History    Marital status: SINGLE   Tobacco Use    Smoking status: Former Smoker     Types: Cigarettes     Quit date:      Years since quittin.5    Smokeless tobacco: Never Used   Vaping Use    Vaping Use: Never used   Substance and Sexual Activity    Alcohol use: Yes     Comment: occasionally     Drug use: Not Currently     History reviewed. No pertinent family history. Current Outpatient Medications   Medication Sig Dispense Refill    pravastatin (PRAVACHOL) 10 mg tablet Take 1 Tablet by mouth nightly. 90 Tablet 1    triamterene-hydroCHLOROthiazide (DYAZIDE) 37.5-25 mg per capsule Take 1 Capsule by mouth Every morning. 90 Capsule 1    metFORMIN (GLUCOPHAGE) 500 mg tablet Take 1 Tablet by mouth two (2) times daily (with meals). (Patient taking differently: Take 500 mg by mouth daily (with dinner). ) 180 Tablet 0    aspirin delayed-release (Aspir-81) 81 mg tablet Take 81 mg by mouth daily.  calcium-cholecalciferol, D3, (CALTRATE 600+D) tablet Take  by mouth daily.  diclofenac (VOLTAREN) 1 % gel 1 application      albuterol (PROVENTIL HFA, VENTOLIN HFA, PROAIR HFA) 90 mcg/actuation inhaler INHALE 2 PUFFS BY MOUTH EVERY 4 TO 6 HOURS AS NEEDED FOR WHEEZING      cyanocobalamin 1,000 mcg tablet 1 tablet      cholecalciferol (Vitamin D3) (1000 Units /25 mcg) tablet 1 tablet       Allergies   Allergen Reactions    Adhesive Unknown (comments)    Ketoconazole Hives    Nystatin Hives    Surgical Lubricant Jelly Unknown (comments)       Objective:  Visit Vitals  /78 (BP 1 Location: Left upper arm, BP Patient Position: Sitting, BP Cuff Size: Adult)   Pulse 72   Temp 98 °F (36.7 °C) (Temporal)   Resp 20   Ht 5' 5\" (1.651 m)   Wt 208 lb 6.4 oz (94.5 kg)   SpO2 96%   BMI 34.68 kg/m²       Physical Exam:   Constitutional: General Appearance: Obesity with pleasant personality. Level of Distress: NAD. Psychiatric: Mental Status: normal mood and affect Orientation: to time, place, and person. ,normal eye contact. Head: Head: normocephalic and atraumatic. Eyes: Pupils: PERRLA. Sclerae: non-icteric. Neck: Neck: supple, trachea midline, and no masses. Lymph Nodes: no cervical LAD. Thyroid: no enlargement or nodules and non-tender. Lungs: Respiratory effort: no dyspnea. Auscultation: no wheezing, rales/crackles, or rhonchi and breath sounds normal and good air movement. Cardiovascular: Apical Impulse: not displaced. Heart Auscultation: normal S1 and S2; no murmurs, rubs, or gallops; and RRR. Neck vessels: no carotid bruits. Pulses including femoral / pedal: normal throughout. Abdomen: Bowel Sounds: normal. Inspection and Palpation: no tenderness, guarding, or masses and soft and non-distended.  Liver: non-tender and no hepatomegaly. Spleen: non-tender and no splenomegaly. Musculoskeletal[de-identified] Extremities: no edema,no varicosities. No Calf tenderness. Neurologic: Gait and Station: normal gait and station. Motor Strength normal right and left. Skin: Inspection and palpation: no rash, lesions, or ulcer. Results for orders placed or performed in visit on 07/05/22   AMB POC GLUCOSE BLOOD, BY GLUCOSE MONITORING DEVICE   Result Value Ref Range    Glucose  MG/DL   AMB POC HEMOGLOBIN A1C   Result Value Ref Range    Hemoglobin A1c (POC) 6.6 %       Assessment/Plan:      ICD-10-CM ICD-9-CM    1. Primary hypertension  Z98 812.8 METABOLIC PANEL, BASIC      CANCELED: METABOLIC PANEL, BASIC   2. Controlled type 2 diabetes mellitus without complication, without long-term current use of insulin (HCC)  E11.9 250.00 AMB POC GLUCOSE BLOOD, BY GLUCOSE MONITORING DEVICE      AMB POC HEMOGLOBIN A1C   3. Pure hypercholesterolemia  E78.00 272.0    4. History of TIA (transient ischemic attack)  Z86.73 V12.54    5. Osteoarthritis of spine with radiculopathy, lumbosacral region  M47.27 721.3    6. Need for hepatitis C screening test  Z11.59 V73.89 HEPATITIS C AB   7. Screening for colon cancer  Z12.11 V76.51 COLOGUARD TEST (FECAL DNA COLORECTAL CANCER SCREENING)   8. Menopausal and postmenopausal disorder  N95.9 627.9 DEXA BONE DENSITY STUDY AXIAL     Orders Placed This Encounter    DEXA BONE DENSITY STUDY AXIAL     Standing Status:   Future     Standing Expiration Date:   10/5/2022    HEPATITIS C AB    COLOGUARD TEST (FECAL DNA COLORECTAL CANCER SCREENING)    METABOLIC PANEL, BASIC    AMB POC GLUCOSE BLOOD, BY GLUCOSE MONITORING DEVICE    AMB POC HEMOGLOBIN A1C    pravastatin (PRAVACHOL) 10 mg tablet     Sig: Take 1 Tablet by mouth nightly. Dispense:  90 Tablet     Refill:  1    triamterene-hydroCHLOROthiazide (DYAZIDE) 37.5-25 mg per capsule     Sig: Take 1 Capsule by mouth Every morning.      Dispense:  90 Capsule     Refill:  1 Hypertension controlled she cannot take ACE or ARB. She says that she was started on some medicine in the [place] and it was discontinued after 30 days. She does not recall the reason whether it was angioedema or cough or something else so I am not comfortable to start her on ACE or ARB even though she is diabetic so continued on triamterene HCTZ 37.5/25 mg once a day in early morning. Diet low in sodium. Type 2 diabetes mellitus and she does not want to take metformin twice a day she takes metformin 500 mg once a day with dinner hemoglobin A1c 6.6% in the office and her random blood sugar is 112 in the office. Continue healthy diabetic diet with walking minimum 30 minutes 5 days a week and follow-up with ophthalmologist to have diabetic comprehensive eye exam and continue low-dose aspirin and statin. Hypercholesteremia and prevention being diabetic continue pravastatin 10 mg at bedtime and refills given. Continue vitamin B12. Lumbar DJD with radiculopathy symptoms getting physical therapy has consulted orthopedic surgeon and recommended to discuss with regarding cervical spine DJD and she can have added of physical therapy that sometimes she has numbness in her. Continue vitamin D3 and vitamin B12. Currently no asthma flareup. No depression. History of TIA continue low-dose aspirin statin and control of blood pressure. Bone density ordered. Screening for hep C ordered. Cologuard ordered. She will call her PCP at [place] to find out her vaccination records and other medical queries. Answer all her questions labs ordered follow-up in 3 months. lose weight, follow low fat diet, follow low salt diet, continue present plan, routine labs ordered, call if any problems    There are no Patient Instructions on file for this visit. Follow-up and Dispositions    · Return in about 3 months (around 10/5/2022) for diabetes, hypertension,cholesterol follow up.

## 2022-07-06 LAB
BUN SERPL-MCNC: 13 MG/DL (ref 8–27)
BUN/CREAT SERPL: 16 (ref 12–28)
CALCIUM SERPL-MCNC: 9.7 MG/DL (ref 8.7–10.3)
CHLORIDE SERPL-SCNC: 104 MMOL/L (ref 96–106)
CO2 SERPL-SCNC: 24 MMOL/L (ref 20–29)
CREAT SERPL-MCNC: 0.82 MG/DL (ref 0.57–1)
EGFR: 75 ML/MIN/1.73
GLUCOSE SERPL-MCNC: 111 MG/DL (ref 65–99)
HCV AB S/CO SERPL IA: <0.1 S/CO RATIO (ref 0–0.9)
POTASSIUM SERPL-SCNC: 4.6 MMOL/L (ref 3.5–5.2)
SODIUM SERPL-SCNC: 146 MMOL/L (ref 134–144)

## 2022-07-06 NOTE — PROGRESS NOTES
Please call Ms. Marquis Thurman for hep C negative. Random blood sugar 111 that is good. Kidney function normal.Potassium is 4.6 normal and sodium is slightly up may be due to being on fluid pill called diuretic pill, take diet low in sodium

## 2022-08-01 PROBLEM — Z12.11 SCREENING FOR COLON CANCER: Status: RESOLVED | Noted: 2022-03-06 | Resolved: 2022-08-01

## 2022-08-04 LAB — COLOGUARD TEST, EXTERNAL: POSITIVE

## 2022-08-09 ENCOUNTER — DOCUMENTATION ONLY (OUTPATIENT)
Dept: INTERNAL MEDICINE CLINIC | Age: 77
End: 2022-08-09

## 2022-08-09 DIAGNOSIS — R19.5 POSITIVE COLORECTAL CANCER SCREENING USING COLOGUARD TEST: Primary | ICD-10-CM

## 2022-08-09 NOTE — PROGRESS NOTES
Refer her to gastroenterologist her Cologuard is positive to rule out other colon related pathologies including diverticulosis polyp and cancer and hemorrhoids and other lesions

## 2022-08-31 RX ORDER — METFORMIN HYDROCHLORIDE 500 MG/1
TABLET ORAL
Qty: 180 TABLET | Refills: 0 | Status: SHIPPED | OUTPATIENT
Start: 2022-08-31

## 2022-09-01 ENCOUNTER — TELEPHONE (OUTPATIENT)
Dept: INTERNAL MEDICINE CLINIC | Age: 77
End: 2022-09-01

## 2022-09-01 RX ORDER — LANCETS
EACH MISCELLANEOUS
Qty: 30 EACH | Refills: 5 | Status: SHIPPED | OUTPATIENT
Start: 2022-09-01

## 2022-09-01 RX ORDER — TRIAMTERENE AND HYDROCHLOROTHIAZIDE 37.5; 25 MG/1; MG/1
1 CAPSULE ORAL EVERY MORNING
Qty: 90 CAPSULE | Refills: 1 | Status: SHIPPED | OUTPATIENT
Start: 2022-09-01

## 2022-09-01 RX ORDER — BLOOD SUGAR DIAGNOSTIC
STRIP MISCELLANEOUS
Qty: 30 STRIP | Refills: 5 | Status: SHIPPED | OUTPATIENT
Start: 2022-09-01

## 2022-09-01 RX ORDER — PRAVASTATIN SODIUM 10 MG/1
10 TABLET ORAL
Qty: 90 TABLET | Refills: 1 | Status: SHIPPED | OUTPATIENT
Start: 2022-09-01

## 2022-09-01 NOTE — TELEPHONE ENCOUNTER
----- Message from Yesi Hi sent at 8/31/2022  4:32 PM EDT -----  Subject: Refill Request    QUESTIONS  Name of Medication? metFORMIN (GLUCOPHAGE) 500 mg tablet  Patient-reported dosage and instructions? 500 mg  How many days do you have left? 0  Preferred Pharmacy? [facility] phone number (if available)? [phone]  ---------------------------------------------------------------------------  --------------  Kelsey ROBLES  What is the best way for the office to contact you? OK to leave message on   voicemail  Preferred Call Back Phone Number? [phone]  ---------------------------------------------------------------------------  --------------  SCRIPT ANSWERS  Relationship to Patient?  Self

## 2022-09-01 NOTE — TELEPHONE ENCOUNTER
----- Message from Felicity Ly sent at 8/31/2022  4:31 PM EDT -----  Subject: Refill Request    QUESTIONS  Name of Medication? triamterene-hydroCHLOROthiazide (DYAZIDE) 37.5-25 mg   per capsule  Patient-reported dosage and instructions? 37.5-25 mg capsule  How many days do you have left? 0  [facility] phone number (if available)? [phone]  ---------------------------------------------------------------------------  --------------  Sirena Mcclendon INFO  What is the best way for the office to contact you? OK to leave message on   voicemail  Preferred Call Back Phone Number? [phone]  ---------------------------------------------------------------------------  --------------  SCRIPT ANSWERS  Relationship to Patient?  Self

## 2022-09-01 NOTE — TELEPHONE ENCOUNTER
----- Message from Chery Andre sent at 8/31/2022  4:35 PM EDT -----  Subject: Refill Request    QUESTIONS  Name of Medication? Other - Lancets for Accu-Chek Soft click  Patient-reported dosage and instructions? Soft Click for the Malgorzata Plus   machine   How many days do you have left? 0  Preferred Pharmacy? [facility] phone number (if available)? [phone]  ---------------------------------------------------------------------------  --------------  William Celis INFO  What is the best way for the office to contact you? OK to leave message on   voicemail  Preferred Call Back Phone Number? [phone]  ---------------------------------------------------------------------------  --------------  SCRIPT ANSWERS  Relationship to Patient?  Self

## 2022-09-01 NOTE — TELEPHONE ENCOUNTER
----- Message from Persaud Call sent at 8/31/2022  4:32 PM EDT -----  Subject: Refill Request    QUESTIONS  Name of Medication? pravastatin (PRAVACHOL) 10 mg tablet  Patient-reported dosage and instructions? 10 mg   How many days do you have left? 0  [facility]erred [facility]acy? [facility] phone number (if available)? [phone]  ---------------------------------------------------------------------------  --------------  [place] INFO  What is the best way for the office to contact you? OK to leave message on   voicemail  Preferred Call Back Phone Number? [phone]  ---------------------------------------------------------------------------  --------------  SCRIPT ANSWERS  Relationship to Patient?  Self

## 2022-10-12 ENCOUNTER — TELEPHONE (OUTPATIENT)
Dept: INTERNAL MEDICINE CLINIC | Age: 77
End: 2022-10-12

## 2022-10-12 NOTE — TELEPHONE ENCOUNTER
----- Message from Nathalie Luevano sent at 10/12/2022  1:51 PM EDT -----  Subject: Appointment Request    Reason for Call: Established Patient Appointment needed: Routine Existing   Condition Follow Up (Diabetes)    QUESTIONS    Reason for appointment request? No appointments available during search     Additional Information for Provider? patient is trying to reschedule   appointment   ---------------------------------------------------------------------------  --------------  [address] [employer]  [phone]; OK to leave message on voicemail  ---------------------------------------------------------------------------  --------------  SCRIPT ANSWERS  COVID Screen: Clarisa Shin

## 2023-01-06 NOTE — TELEPHONE ENCOUNTER
Rx refills requested and sent pending to Dr. Shannan Subramanian. Metformin, pravastatin, test strips, triamterene/hctz.

## 2023-01-06 NOTE — TELEPHONE ENCOUNTER
[facility] request for the following Rx     Triamterene 37.5mg - Hydrocholorothiazide 25mg Capsule    Metformin 500mg Tablet    Pravastatin 10mg Tablet      NOV 1/18/23 Sarah DAVIES 7/5/22 Philippe Eaton

## 2023-01-09 RX ORDER — METFORMIN HYDROCHLORIDE 500 MG/1
TABLET ORAL
Qty: 60 TABLET | Refills: 0 | Status: SHIPPED | OUTPATIENT
Start: 2023-01-09 | End: 2023-01-18

## 2023-01-09 RX ORDER — BLOOD SUGAR DIAGNOSTIC
STRIP MISCELLANEOUS
Qty: 30 STRIP | Refills: 5 | Status: SHIPPED | OUTPATIENT
Start: 2023-01-09 | End: 2023-01-19 | Stop reason: SDUPTHER

## 2023-01-09 RX ORDER — PRAVASTATIN SODIUM 10 MG/1
10 TABLET ORAL
Qty: 90 TABLET | Refills: 0 | Status: SHIPPED | OUTPATIENT
Start: 2023-01-09 | End: 2023-01-22 | Stop reason: SDUPTHER

## 2023-01-09 RX ORDER — TRIAMTERENE AND HYDROCHLOROTHIAZIDE 37.5; 25 MG/1; MG/1
1 CAPSULE ORAL EVERY MORNING
Qty: 60 CAPSULE | Refills: 0 | Status: SHIPPED | OUTPATIENT
Start: 2023-01-09 | End: 2023-01-18 | Stop reason: SDUPTHER

## 2023-01-18 ENCOUNTER — OFFICE VISIT (OUTPATIENT)
Dept: INTERNAL MEDICINE CLINIC | Age: 78
End: 2023-01-18
Payer: MEDICARE

## 2023-01-18 VITALS
HEIGHT: 65 IN | BODY MASS INDEX: 34.12 KG/M2 | HEART RATE: 78 BPM | DIASTOLIC BLOOD PRESSURE: 76 MMHG | RESPIRATION RATE: 18 BRPM | TEMPERATURE: 97 F | SYSTOLIC BLOOD PRESSURE: 123 MMHG | OXYGEN SATURATION: 99 % | WEIGHT: 204.8 LBS

## 2023-01-18 DIAGNOSIS — E78.00 PURE HYPERCHOLESTEROLEMIA: ICD-10-CM

## 2023-01-18 DIAGNOSIS — R41.3 MEMORY DIFFICULTIES: ICD-10-CM

## 2023-01-18 DIAGNOSIS — I10 PRIMARY HYPERTENSION: Primary | ICD-10-CM

## 2023-01-18 DIAGNOSIS — E11.9 CONTROLLED TYPE 2 DIABETES MELLITUS WITHOUT COMPLICATION, WITHOUT LONG-TERM CURRENT USE OF INSULIN (HCC): ICD-10-CM

## 2023-01-18 DIAGNOSIS — Z85.818 HISTORY OF MALIGNANT NEOPLASM OF PAROTID GLAND: ICD-10-CM

## 2023-01-18 LAB — HBA1C MFR BLD HPLC: 6.2 %

## 2023-01-18 RX ORDER — TRIAMTERENE AND HYDROCHLOROTHIAZIDE 37.5; 25 MG/1; MG/1
1 CAPSULE ORAL EVERY MORNING
Qty: 90 CAPSULE | Refills: 0 | Status: SHIPPED | OUTPATIENT
Start: 2023-01-18

## 2023-01-18 RX ORDER — METFORMIN HYDROCHLORIDE 500 MG/1
500 TABLET ORAL DAILY
Qty: 90 TABLET | Refills: 1 | Status: SHIPPED | OUTPATIENT
Start: 2023-01-18

## 2023-01-18 RX ORDER — METFORMIN HYDROCHLORIDE 500 MG/1
500 TABLET ORAL DAILY
COMMUNITY
End: 2023-01-18 | Stop reason: SDUPTHER

## 2023-01-18 NOTE — PROGRESS NOTES
1. \"Have you been to the ER, urgent care clinic since your last visit? Hospitalized since your last visit? \" No    2. \"Have you seen or consulted any other health care providers outside of the [address] [place] [address] since your last visit? \" Yes When: 2 weeks ago  Where: Dr. Jluis Tran  Reason for visit: Dentist       Opthamologist- Dr. Divya Shi     3. For patients aged 39-70: Has the patient had a colonoscopy / FIT/ Cologuard? Yes - Care Gap present. Most recent result on file      If the patient is female:    4. For patients aged 41-77: Has the patient had a mammogram within the past 2 years? NA - based on age or sex      11. For patients aged 21-65: Has the patient had a pap smear?  NA - based on age or sex    Chief Complaint   Patient presents with    Follow-up    Hypertension    Diabetes    Cholesterol Problem     Visit Vitals  /76 (BP 1 Location: Left upper arm, BP Patient Position: Sitting, BP Cuff Size: Adult)   Pulse 78   Temp 97 °F (36.1 °C) (Temporal)   Resp 18   Ht 5' 5\" (1.651 m)   Wt 204 lb 12.8 oz (92.9 kg)   SpO2 99%   BMI 34.08 kg/m²

## 2023-01-18 NOTE — PROGRESS NOTES
Beba Kendall (: 1945) is a 78 y.o. female, established patient, here for evaluation of the following chief complaint(s):  Follow-up, Hypertension, Diabetes, and Cholesterol Problem       ASSESSMENT/PLAN:  Below is the assessment and plan developed based on review of pertinent history, physical exam, labs, studies, and medications. 1. Primary hypertension  -     METABOLIC PANEL, COMPREHENSIVE  -     CBC WITH AUTOMATED DIFF  -     LIPID PANEL  -     triamterene-hydroCHLOROthiazide (DYAZIDE) 37.5-25 mg per capsule; Take 1 Capsule by mouth Every morning., Normal, Disp-90 Capsule, R-0  2. Pure hypercholesterolemia  -     LIPID PANEL  3. Controlled type 2 diabetes mellitus without complication, without long-term current use of insulin (HCC)  -     metFORMIN (GLUCOPHAGE) 500 mg tablet; Take 1 Tablet by mouth daily. , Normal, Disp-90 Tablet, R-1  -     AMB POC HEMOGLOBIN A1C  4. Memory difficulties  -     VITAMIN B12  -     TSH 3RD GENERATION  -     T4, FREE  -     REFERRAL TO NEUROLOGY  5. History of malignant neoplasm of parotid gland  -     REFERRAL TO ENT-OTOLARYNGOLOGY  Pcp\" Dr. Brynn Hernandez   Hypertension: Controlled, continue current medications. Diabetes: Controlled, HbA1c 6.1 in clinic today. Continue current medications. Hyperlipidemia: Currently on pravastatin, lipid panel ordered. Memory difficulties: Blood work ordered, referral to neurology given. History of parotid gland tumor, referral to ENT. Return in about 3 months (around 2023) for follow up. SUBJECTIVE/OBJECTIVE:  HPI    Beba Kendall is a 27-year-old who presents to the clinic for follow-up. PCP: Dr. Brynn Hernandez     She has a history of hypertension, diabetes, hyperlipidemia. Questionable allergy to lisinopril/losartan as per last pcp note. Her Cologuard test back in  was positive. Scheduled for colonoscopy in . Hx of left parotid gland tumor, s/p surgery in , in remission.  She is not currently following with ENT for surveillance. Hx of Nodules in lungs, following up with pulmonology. Has to call to up to make follow up appointment. Has been checking Fbs 90-110s. Wants to wait to [facility]. She already had a bone denisty scan done back in [place]. She will try to get records for us. Trouble soemtimes with remmbering meds, but no problems with bills or address or getting lost. Denies depression     She  hit her head to the refrigerator, no LOC in December. Had Headache and toothache which are improving. Allergies   Allergen Reactions    Adhesive Hives    Ketoconazole Hives    Nystatin Hives    Penicillins Other (comments)     Yeast infections     Surgical Lubricant Jelly Unknown (comments)     Current Outpatient Medications   Medication Sig    triamterene-hydroCHLOROthiazide (DYAZIDE) 37.5-25 mg per capsule Take 1 Capsule by mouth Every morning. metFORMIN (GLUCOPHAGE) 500 mg tablet Take 1 Tablet by mouth daily. glucose blood VI test strips (Accu-Chek Malgorzata Plus test strp) strip Glucose once a day    pravastatin (PRAVACHOL) 10 mg tablet Take 1 Tablet by mouth nightly. lancets misc Glucose once a dayE11.9    aspirin delayed-release 81 mg tablet Take 81 mg by mouth daily. calcium-cholecalciferol, D3, (CALTRATE 600+D) tablet Take  by mouth daily. diclofenac (VOLTAREN) 1 % gel 1 application    cyanocobalamin 1,000 mcg tablet 1 tablet    cholecalciferol (VITAMIN D3) (1000 Units /25 mcg) tablet 1 tablet    albuterol (PROVENTIL HFA, VENTOLIN HFA, PROAIR HFA) 90 mcg/actuation inhaler INHALE 2 PUFFS BY MOUTH EVERY 4 TO 6 HOURS AS NEEDED FOR WHEEZING (Patient not taking: Reported on 2023)     No current facility-administered medications for this visit.      Past Medical History:   Diagnosis Date    CAD (coronary artery disease)     Cancer ([facility] [address]      delivery delivered 1979    Diabetes [facility])     Hypercholesterolemia     Hypertension     Stroke [facility])      Past Surgical History:   Procedure Laterality Date    HX  SECTION      HX CHOLECYSTECTOMY      HX CYST REMOVAL  1980    Pylinial cyst and fibroid tissue     HX HYSTERECTOMY  1989    HX KNEE ARTHROSCOPY      [place] UNLISTED PROCEDURE BREAST Right 1991    right breast laser surgery      Family History   Problem Relation Age of Onset    Diabetes Mother     Stroke Mother     Hypertension Mother     Cancer Father     Hypertension Father     Diabetes Sister     Hypertension Sister     Hypertension Brother     Diabetes Brother     Hypertension Brother      Social History     Tobacco Use   Smoking Status Former    Packs/day: 0.25    Years: 20.00    Pack years: 5.00    Types: Cigarettes    Quit date:     Years since quittin.0   Smokeless Tobacco Never         Review of Systems   Constitutional:  Negative for fever and malaise/fatigue. HENT:  Negative for congestion, hearing loss and sore throat. Eyes: Negative. Respiratory:  Negative for cough and shortness of breath. Cardiovascular:  Negative for chest pain, palpitations and leg swelling. Gastrointestinal:  Negative for abdominal pain, nausea and vomiting. Genitourinary: Negative. Musculoskeletal:  Negative for joint pain. Skin: Negative. Neurological:  Negative for focal weakness and loss of consciousness. Psychiatric/Behavioral: Negative. Negative for depression. Vitals:    23 1445   BP: 123/76   Pulse: 78   Resp: 18   Temp: 97 °F (36.1 °C)   TempSrc: Temporal   SpO2: 99%   Weight: 204 lb 12.8 oz (92.9 kg)   Height: 5' 5\" (1.651 m)      Physical Exam  Constitutional:       General: She is not in acute distress. Appearance: Normal appearance. HENT:      Head: Normocephalic. Nose: Nose normal.      Mouth/Throat:      Mouth: Mucous membranes are moist.   Eyes:      Extraocular Movements: Extraocular movements intact.       Conjunctiva/sclera: Conjunctivae normal.      Pupils: Pupils are equal, round, and reactive to light. Cardiovascular:      Rate and Rhythm: Normal rate and regular rhythm. Heart sounds: Normal heart sounds. Pulmonary:      Effort: Pulmonary effort is normal.      Breath sounds: Normal breath sounds. Abdominal:      General: Bowel sounds are normal. There is no distension. Palpations: Abdomen is soft. Tenderness: There is no abdominal tenderness. Musculoskeletal:         General: No swelling or tenderness. Cervical back: Normal range of motion and neck supple. Skin:     General: Skin is warm and dry. Neurological:      General: No focal deficit present. Mental Status: She is alert. Mental status is at baseline. Motor: No weakness. Psychiatric:         Mood and Affect: Mood normal.         Behavior: Behavior normal.               An electronic signature was used to authenticate this note.   -- Dawn Lopez MD

## 2023-01-21 LAB
ALBUMIN SERPL-MCNC: 4.5 G/DL (ref 3.7–4.7)
ALBUMIN/GLOB SERPL: 1.5 {RATIO} (ref 1.2–2.2)
ALP SERPL-CCNC: 140 IU/L (ref 44–121)
ALT SERPL-CCNC: 19 IU/L (ref 0–32)
AST SERPL-CCNC: 18 IU/L (ref 0–40)
BASOPHILS # BLD AUTO: 0 X10E3/UL (ref 0–0.2)
BASOPHILS NFR BLD AUTO: 1 %
BILIRUB SERPL-MCNC: 0.2 MG/DL (ref 0–1.2)
BUN SERPL-MCNC: 14 MG/DL (ref 8–27)
BUN/CREAT SERPL: 17 (ref 12–28)
CALCIUM SERPL-MCNC: 9.6 MG/DL (ref 8.7–10.3)
CHLORIDE SERPL-SCNC: 102 MMOL/L (ref 96–106)
CHOLEST SERPL-MCNC: 188 MG/DL (ref 100–199)
CO2 SERPL-SCNC: 25 MMOL/L (ref 20–29)
CREAT SERPL-MCNC: 0.84 MG/DL (ref 0.57–1)
EGFR: 72 ML/MIN/1.73
EOSINOPHIL # BLD AUTO: 0.1 X10E3/UL (ref 0–0.4)
EOSINOPHIL NFR BLD AUTO: 2 %
ERYTHROCYTE [DISTWIDTH] IN BLOOD BY AUTOMATED COUNT: 13.5 % (ref 11.7–15.4)
GLOBULIN SER CALC-MCNC: 3.1 G/DL (ref 1.5–4.5)
GLUCOSE SERPL-MCNC: 127 MG/DL (ref 70–99)
HCT VFR BLD AUTO: 36.3 % (ref 34–46.6)
HDLC SERPL-MCNC: 62 MG/DL
HGB BLD-MCNC: 12.4 G/DL (ref 11.1–15.9)
IMM GRANULOCYTES # BLD AUTO: 0 X10E3/UL (ref 0–0.1)
IMM GRANULOCYTES NFR BLD AUTO: 0 %
LDLC SERPL CALC-MCNC: 112 MG/DL (ref 0–99)
LYMPHOCYTES # BLD AUTO: 1.7 X10E3/UL (ref 0.7–3.1)
LYMPHOCYTES NFR BLD AUTO: 39 %
MCH RBC QN AUTO: 30.6 PG (ref 26.6–33)
MCHC RBC AUTO-ENTMCNC: 34.2 G/DL (ref 31.5–35.7)
MCV RBC AUTO: 90 FL (ref 79–97)
MONOCYTES # BLD AUTO: 0.4 X10E3/UL (ref 0.1–0.9)
MONOCYTES NFR BLD AUTO: 10 %
NEUTROPHILS # BLD AUTO: 2.1 X10E3/UL (ref 1.4–7)
NEUTROPHILS NFR BLD AUTO: 48 %
PLATELET # BLD AUTO: 292 X10E3/UL (ref 150–450)
POTASSIUM SERPL-SCNC: 4.3 MMOL/L (ref 3.5–5.2)
PROT SERPL-MCNC: 7.6 G/DL (ref 6–8.5)
RBC # BLD AUTO: 4.05 X10E6/UL (ref 3.77–5.28)
SODIUM SERPL-SCNC: 141 MMOL/L (ref 134–144)
T4 FREE SERPL-MCNC: 1.34 NG/DL (ref 0.82–1.77)
TRIGL SERPL-MCNC: 77 MG/DL (ref 0–149)
TSH SERPL DL<=0.005 MIU/L-ACNC: 1.48 UIU/ML (ref 0.45–4.5)
VIT B12 SERPL-MCNC: 1649 PG/ML (ref 232–1245)
VLDLC SERPL CALC-MCNC: 14 MG/DL (ref 5–40)
WBC # BLD AUTO: 4.4 X10E3/UL (ref 3.4–10.8)

## 2023-01-22 RX ORDER — LANCETS
EACH MISCELLANEOUS
Qty: 30 EACH | Refills: 5 | Status: SHIPPED | OUTPATIENT
Start: 2023-01-22

## 2023-01-22 RX ORDER — BLOOD SUGAR DIAGNOSTIC
STRIP MISCELLANEOUS
Qty: 30 STRIP | Refills: 5 | Status: SHIPPED | OUTPATIENT
Start: 2023-01-22

## 2023-01-22 RX ORDER — PRAVASTATIN SODIUM 20 MG/1
20 TABLET ORAL
Qty: 90 TABLET | Refills: 1 | Status: SHIPPED | OUTPATIENT
Start: 2023-01-22

## 2023-01-22 NOTE — PROGRESS NOTES
Your LDL cholesterol is high, recommend increasing pravastatin to 20 mg. Medication sent to pharmacy. Your vitamin b12 levels are high, you can cut down your supplements to 500 mcg daily. One of your liver test alkaline phosphatase is elevated, we can monitor this for now. Continue low fat. Low carb diet and exercise. Continue diabetes medications.    Other blood work is normal.

## 2023-02-03 RX ORDER — ALBUTEROL SULFATE 90 UG/1
AEROSOL, METERED RESPIRATORY (INHALATION)
Qty: 18 G | Refills: 4 | Status: SHIPPED | OUTPATIENT
Start: 2023-02-03

## 2023-05-03 DIAGNOSIS — I10 PRIMARY HYPERTENSION: ICD-10-CM

## 2023-05-03 RX ORDER — TRIAMTERENE AND HYDROCHLOROTHIAZIDE 37.5; 25 MG/1; MG/1
CAPSULE ORAL
Qty: 90 CAPSULE | Refills: 0 | Status: SHIPPED | OUTPATIENT
Start: 2023-05-03

## 2023-06-02 RX ORDER — BLOOD SUGAR DIAGNOSTIC
STRIP MISCELLANEOUS
Qty: 100 STRIP | OUTPATIENT
Start: 2023-06-02

## 2023-06-21 ENCOUNTER — OFFICE VISIT (OUTPATIENT)
Age: 78
End: 2023-06-21
Payer: MEDICARE

## 2023-06-21 VITALS
RESPIRATION RATE: 18 BRPM | HEART RATE: 78 BPM | OXYGEN SATURATION: 98 % | BODY MASS INDEX: 33.99 KG/M2 | SYSTOLIC BLOOD PRESSURE: 124 MMHG | WEIGHT: 204 LBS | HEIGHT: 65 IN | DIASTOLIC BLOOD PRESSURE: 80 MMHG

## 2023-06-21 DIAGNOSIS — Z85.819 HISTORY OF MALIGNANT NEOPLASM OF SALIVARY GLAND: Primary | ICD-10-CM

## 2023-06-21 DIAGNOSIS — H93.13 TINNITUS OF BOTH EARS: ICD-10-CM

## 2023-06-21 PROCEDURE — 3074F SYST BP LT 130 MM HG: CPT | Performed by: OTOLARYNGOLOGY

## 2023-06-21 PROCEDURE — G8400 PT W/DXA NO RESULTS DOC: HCPCS | Performed by: OTOLARYNGOLOGY

## 2023-06-21 PROCEDURE — 1036F TOBACCO NON-USER: CPT | Performed by: OTOLARYNGOLOGY

## 2023-06-21 PROCEDURE — G8427 DOCREV CUR MEDS BY ELIG CLIN: HCPCS | Performed by: OTOLARYNGOLOGY

## 2023-06-21 PROCEDURE — G8417 CALC BMI ABV UP PARAM F/U: HCPCS | Performed by: OTOLARYNGOLOGY

## 2023-06-21 PROCEDURE — 99203 OFFICE O/P NEW LOW 30 MIN: CPT | Performed by: OTOLARYNGOLOGY

## 2023-06-21 PROCEDURE — 1123F ACP DISCUSS/DSCN MKR DOCD: CPT | Performed by: OTOLARYNGOLOGY

## 2023-06-21 PROCEDURE — 1090F PRES/ABSN URINE INCON ASSESS: CPT | Performed by: OTOLARYNGOLOGY

## 2023-06-21 PROCEDURE — 3079F DIAST BP 80-89 MM HG: CPT | Performed by: OTOLARYNGOLOGY

## 2023-06-21 ASSESSMENT — ENCOUNTER SYMPTOMS
APNEA: 0
SHORTNESS OF BREATH: 0
NAUSEA: 0
EYE DISCHARGE: 0
EYE ITCHING: 0
VOMITING: 0
SORE THROAT: 0
BACK PAIN: 0
STRIDOR: 0
VOICE CHANGE: 0
ABDOMINAL PAIN: 0
CHOKING: 0
PHOTOPHOBIA: 0
SINUS PRESSURE: 0
TROUBLE SWALLOWING: 0
SINUS PAIN: 0
COUGH: 0
WHEEZING: 0

## 2023-06-21 NOTE — PROGRESS NOTES
Subjective:    Jose G Rivera   70 y.o.   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 She had surgery in [place] in 2004. Denies having postoperative radiation. She has been doing well, last followed with ENT around 4 years ago but then she moved to [place] during the pandemic. She has no complaints regarding her saliva glands other than some dry mouth. She apparently has some oral surgery coming up. She does complain of some mild bilateral tinnitus without concerns for hearing loss. Review of Systems  Review of Systems   Constitutional:  Negative for appetite change, chills, fatigue and fever. HENT:  Positive for tinnitus. Negative for congestion, ear discharge, ear pain, nosebleeds, postnasal drip, sinus pressure, sinus pain, sneezing, sore throat, trouble swallowing and voice change. Eyes:  Negative for photophobia, discharge, itching and visual disturbance. Respiratory:  Negative for apnea, cough, choking, shortness of breath, wheezing and stridor. Cardiovascular:  Negative for chest pain and palpitations. Gastrointestinal:  Negative for abdominal pain, nausea and vomiting. Endocrine: Negative for cold intolerance and heat intolerance. Genitourinary:  Negative for difficulty urinating and dysuria. Musculoskeletal:  Negative for arthralgias, back pain, gait problem, myalgias, neck pain and neck stiffness. Skin:  Negative for rash and wound. Allergic/Immunologic: Negative for environmental allergies and food allergies. Neurological:  Negative for dizziness, speech difficulty, light-headedness and headaches. Hematological:  Negative for adenopathy. Does not bruise/bleed easily. Psychiatric/Behavioral:  Negative for confusion and sleep disturbance. The patient is not nervous/anxious.           Past Medical

## 2023-08-28 RX ORDER — BLOOD SUGAR DIAGNOSTIC
STRIP MISCELLANEOUS
Qty: 100 STRIP | Refills: 2 | Status: SHIPPED | OUTPATIENT
Start: 2023-08-28

## 2024-10-17 ENCOUNTER — HOSPITAL ENCOUNTER (OUTPATIENT)
Facility: HOSPITAL | Age: 79
Discharge: HOME | End: 2024-10-20
Payer: MEDICARE

## 2024-10-17 DIAGNOSIS — N64.4 BREAST PAIN, RIGHT: ICD-10-CM

## 2024-10-17 PROCEDURE — G0279 TOMOSYNTHESIS, MAMMO: HCPCS

## 2025-07-07 ENCOUNTER — OFFICE VISIT (OUTPATIENT)
Age: 80
End: 2025-07-07
Payer: MEDICARE

## 2025-07-07 VITALS
OXYGEN SATURATION: 98 % | DIASTOLIC BLOOD PRESSURE: 80 MMHG | SYSTOLIC BLOOD PRESSURE: 124 MMHG | BODY MASS INDEX: 34.83 KG/M2 | HEIGHT: 64 IN | HEART RATE: 76 BPM | WEIGHT: 204 LBS

## 2025-07-07 DIAGNOSIS — M17.11 UNILATERAL PRIMARY OSTEOARTHRITIS, RIGHT KNEE: ICD-10-CM

## 2025-07-07 DIAGNOSIS — M25.561 RIGHT KNEE PAIN, UNSPECIFIED CHRONICITY: Primary | ICD-10-CM

## 2025-07-07 PROCEDURE — G8428 CUR MEDS NOT DOCUMENT: HCPCS | Performed by: ORTHOPAEDIC SURGERY

## 2025-07-07 PROCEDURE — 3074F SYST BP LT 130 MM HG: CPT | Performed by: ORTHOPAEDIC SURGERY

## 2025-07-07 PROCEDURE — 1090F PRES/ABSN URINE INCON ASSESS: CPT | Performed by: ORTHOPAEDIC SURGERY

## 2025-07-07 PROCEDURE — 99203 OFFICE O/P NEW LOW 30 MIN: CPT | Performed by: ORTHOPAEDIC SURGERY

## 2025-07-07 PROCEDURE — G8417 CALC BMI ABV UP PARAM F/U: HCPCS | Performed by: ORTHOPAEDIC SURGERY

## 2025-07-07 PROCEDURE — 3079F DIAST BP 80-89 MM HG: CPT | Performed by: ORTHOPAEDIC SURGERY

## 2025-07-07 PROCEDURE — G8400 PT W/DXA NO RESULTS DOC: HCPCS | Performed by: ORTHOPAEDIC SURGERY

## 2025-07-07 PROCEDURE — 1123F ACP DISCUSS/DSCN MKR DOCD: CPT | Performed by: ORTHOPAEDIC SURGERY

## 2025-07-07 PROCEDURE — 1036F TOBACCO NON-USER: CPT | Performed by: ORTHOPAEDIC SURGERY

## 2025-07-07 ASSESSMENT — PATIENT HEALTH QUESTIONNAIRE - PHQ9
SUM OF ALL RESPONSES TO PHQ QUESTIONS 1-9: 0
2. FEELING DOWN, DEPRESSED OR HOPELESS: NOT AT ALL
1. LITTLE INTEREST OR PLEASURE IN DOING THINGS: NOT AT ALL

## 2025-07-07 NOTE — PROGRESS NOTES
Identified pt with two pt identifiers (name and ). Reviewed chart in preparation for visit and have obtained necessary documentation.    Reyna Pradhan is a 80 y.o. female New Patient (NP Right knee pain has a tear in the MCL. Stopped taken OTC 2025 has had a injection as well)  .    Vitals:    25 1312   BP: 124/80   BP Site: Left Upper Arm   Patient Position: Sitting   BP Cuff Size: Medium Adult   Pulse: 76   SpO2: 98%   Weight: 92.5 kg (204 lb)   Height: 1.626 m (5' 4\")          1. Have you been to the ER, urgent care clinic since your last visit?  Hospitalized since your last visit?  no     2. Have you seen or consulted any other health care providers outside of the [facility] since your last visit?  Include any pap smears or colon screening.  no

## 2025-07-08 NOTE — PROGRESS NOTES
2025    Chief Complaint: Right knee pain    Assessment: Osteoarthritis right knee    Plan:  This patient and I did discuss the many options in treating knee osteoarthritis.  We did discuss that we could continue to seek out nonoperative modalities, such as: NSAIDs, oral and topical analgesics, knee injections, knee braces, physical therapy, stretching, strengthening, and weight loss strategies, activity modification, ambulatory assistive devices. The patient stated their understanding with this, but and would like to proceed with nonsurgical management in the form of .physical therapy, bracing.    HPI: This is a 80 y.o. female who complains of right knee pain. Onset was gradual, but she recently had a fall as well.  The patient has had activity dependent pain for several months.    The patient has tried activity modification, physical therapy exercises.  The pain is in the anterior knee, it is moderate, improving in intensity.  The patient feels unstable with the knee, fears falling, and has significant limitation with activities of daily living, recreation, and walks with a limp.    Past Medical History:   Diagnosis Date    CAD (coronary artery disease)     Cancer (HCC)      delivery delivered 1979    Diabetes (HCC)     Hypercholesterolemia     Hypertension     Stroke (HCC)        Past Surgical History:   Procedure Laterality Date    BREAST SURGERY Right 1991    right breast laser surgery      SECTION      CHOLECYSTECTOMY      CYST REMOVAL  1980    Pylinial cyst and fibroid tissue     HYSTERECTOMY (CERVIX STATUS UNKNOWN)  1989    KNEE ARTHROSCOPY      SALIVARY GLAND SURGERY Left            Current Outpatient Medications on File Prior to Visit   Medication Sig Dispense Refill    ACCU-CHEK ENHA PLUS strip TEST BLOOD SUGAR ONE TIME DAILY 100 strip 2    Lancets MISC Glucose once a dayE11.9      albuterol sulfate HFA (PROVENTIL;VENTOLIN;PROAIR) 108 (90 Base) MCG/ACT

## 2025-07-15 ENCOUNTER — TELEPHONE (OUTPATIENT)
Age: 80
End: 2025-07-15

## 2025-07-15 NOTE — TELEPHONE ENCOUNTER
Patient called requesting the office fax over her PT referral to [facility]. Patient stated that [facility] would not take her referral because Dr. Layton is not an [facility] provider. [facility] is out of network with the patients insurance. Referral faxed to the [place] location at [phone] at 1525 pm.